# Patient Record
Sex: FEMALE | Race: NATIVE HAWAIIAN OR OTHER PACIFIC ISLANDER | NOT HISPANIC OR LATINO | Employment: UNEMPLOYED | URBAN - METROPOLITAN AREA
[De-identification: names, ages, dates, MRNs, and addresses within clinical notes are randomized per-mention and may not be internally consistent; named-entity substitution may affect disease eponyms.]

---

## 2021-08-30 PROCEDURE — 99285 EMERGENCY DEPT VISIT HI MDM: CPT

## 2021-08-31 ENCOUNTER — HOSPITAL ENCOUNTER (OUTPATIENT)
Facility: MEDICAL CENTER | Age: 51
End: 2021-09-01
Attending: EMERGENCY MEDICINE | Admitting: STUDENT IN AN ORGANIZED HEALTH CARE EDUCATION/TRAINING PROGRAM
Payer: MEDICAID

## 2021-08-31 ENCOUNTER — APPOINTMENT (OUTPATIENT)
Dept: CARDIOLOGY | Facility: MEDICAL CENTER | Age: 51
End: 2021-08-31
Attending: STUDENT IN AN ORGANIZED HEALTH CARE EDUCATION/TRAINING PROGRAM
Payer: MEDICAID

## 2021-08-31 ENCOUNTER — APPOINTMENT (OUTPATIENT)
Dept: RADIOLOGY | Facility: MEDICAL CENTER | Age: 51
End: 2021-08-31
Attending: EMERGENCY MEDICINE
Payer: MEDICAID

## 2021-08-31 DIAGNOSIS — G44.209 TENSION HEADACHE: ICD-10-CM

## 2021-08-31 DIAGNOSIS — I16.1 HYPERTENSIVE EMERGENCY: ICD-10-CM

## 2021-08-31 PROBLEM — Z59.00 HOMELESSNESS: Status: ACTIVE | Noted: 2021-08-31

## 2021-08-31 PROBLEM — I16.0 HYPERTENSIVE URGENCY: Status: ACTIVE | Noted: 2021-08-31

## 2021-08-31 PROBLEM — I50.9 CHF (CONGESTIVE HEART FAILURE) (HCC): Status: ACTIVE | Noted: 2021-08-31

## 2021-08-31 PROBLEM — R79.89 ELEVATED TROPONIN: Status: ACTIVE | Noted: 2021-08-31

## 2021-08-31 PROBLEM — Z91.89 AT RISK FOR OBSTRUCTIVE SLEEP APNEA: Status: ACTIVE | Noted: 2021-08-31

## 2021-08-31 PROBLEM — E66.01 CLASS 3 SEVERE OBESITY IN ADULT (HCC): Status: ACTIVE | Noted: 2021-08-31

## 2021-08-31 PROBLEM — E78.5 HLD (HYPERLIPIDEMIA): Status: ACTIVE | Noted: 2021-08-31

## 2021-08-31 LAB
ALBUMIN SERPL BCP-MCNC: 3.7 G/DL (ref 3.2–4.9)
ALP SERPL-CCNC: 116 U/L (ref 30–99)
ALT SERPL-CCNC: 26 U/L (ref 2–50)
AMPHET UR QL SCN: NEGATIVE
ANION GAP SERPL CALC-SCNC: 14 MMOL/L (ref 7–16)
APPEARANCE UR: CLEAR
AST SERPL-CCNC: 41 U/L (ref 12–45)
BARBITURATES UR QL SCN: NEGATIVE
BASOPHILS # BLD AUTO: 0.7 % (ref 0–1.8)
BASOPHILS # BLD: 0.06 K/UL (ref 0–0.12)
BENZODIAZ UR QL SCN: NEGATIVE
BILIRUB CONJ SERPL-MCNC: <0.2 MG/DL (ref 0.1–0.5)
BILIRUB INDIRECT SERPL-MCNC: ABNORMAL MG/DL (ref 0–1)
BILIRUB SERPL-MCNC: 0.2 MG/DL (ref 0.1–1.5)
BILIRUB UR QL STRIP.AUTO: NEGATIVE
BUN SERPL-MCNC: 21 MG/DL (ref 8–22)
BZE UR QL SCN: NEGATIVE
CALCIUM SERPL-MCNC: 9.2 MG/DL (ref 8.5–10.5)
CANNABINOIDS UR QL SCN: NEGATIVE
CHLORIDE SERPL-SCNC: 99 MMOL/L (ref 96–112)
CK SERPL-CCNC: 68 U/L (ref 0–154)
CO2 SERPL-SCNC: 23 MMOL/L (ref 20–33)
COLOR UR: YELLOW
CREAT SERPL-MCNC: 0.84 MG/DL (ref 0.5–1.4)
EKG IMPRESSION: NORMAL
EKG IMPRESSION: NORMAL
EOSINOPHIL # BLD AUTO: 0.17 K/UL (ref 0–0.51)
EOSINOPHIL NFR BLD: 1.9 % (ref 0–6.9)
ERYTHROCYTE [DISTWIDTH] IN BLOOD BY AUTOMATED COUNT: 38.8 FL (ref 35.9–50)
EST. AVERAGE GLUCOSE BLD GHB EST-MCNC: 252 MG/DL
GLUCOSE BLD-MCNC: 214 MG/DL (ref 65–99)
GLUCOSE BLD-MCNC: 231 MG/DL (ref 65–99)
GLUCOSE BLD-MCNC: 256 MG/DL (ref 65–99)
GLUCOSE SERPL-MCNC: 341 MG/DL (ref 65–99)
GLUCOSE UR STRIP.AUTO-MCNC: 250 MG/DL
HBA1C MFR BLD: 10.4 % (ref 4–5.6)
HCT VFR BLD AUTO: 44.3 % (ref 37–47)
HGB BLD-MCNC: 14.3 G/DL (ref 12–16)
IMM GRANULOCYTES # BLD AUTO: 0.04 K/UL (ref 0–0.11)
IMM GRANULOCYTES NFR BLD AUTO: 0.4 % (ref 0–0.9)
IRON SATN MFR SERPL: 10 % (ref 15–55)
IRON SERPL-MCNC: 37 UG/DL (ref 40–170)
KETONES UR STRIP.AUTO-MCNC: NEGATIVE MG/DL
LEUKOCYTE ESTERASE UR QL STRIP.AUTO: NEGATIVE
LV EJECT FRACT  99904: 45
LV EJECT FRACT MOD 2C 99903: 35.3
LV EJECT FRACT MOD 4C 99902: 40.24
LV EJECT FRACT MOD BP 99901: 39.02
LYMPHOCYTES # BLD AUTO: 2.96 K/UL (ref 1–4.8)
LYMPHOCYTES NFR BLD: 33 % (ref 22–41)
MCH RBC QN AUTO: 26 PG (ref 27–33)
MCHC RBC AUTO-ENTMCNC: 32.3 G/DL (ref 33.6–35)
MCV RBC AUTO: 80.4 FL (ref 81.4–97.8)
METHADONE UR QL SCN: NEGATIVE
MICRO URNS: ABNORMAL
MONOCYTES # BLD AUTO: 0.48 K/UL (ref 0–0.85)
MONOCYTES NFR BLD AUTO: 5.4 % (ref 0–13.4)
NEUTROPHILS # BLD AUTO: 5.26 K/UL (ref 2–7.15)
NEUTROPHILS NFR BLD: 58.6 % (ref 44–72)
NITRITE UR QL STRIP.AUTO: NEGATIVE
NRBC # BLD AUTO: 0 K/UL
NRBC BLD-RTO: 0 /100 WBC
NT-PROBNP SERPL IA-MCNC: 526 PG/ML (ref 0–125)
OPIATES UR QL SCN: NEGATIVE
OXYCODONE UR QL SCN: NEGATIVE
PCP UR QL SCN: NEGATIVE
PH UR STRIP.AUTO: 6 [PH] (ref 5–8)
PLATELET # BLD AUTO: 301 K/UL (ref 164–446)
PMV BLD AUTO: 10.2 FL (ref 9–12.9)
POTASSIUM SERPL-SCNC: 3.8 MMOL/L (ref 3.6–5.5)
PROCALCITONIN SERPL-MCNC: <0.05 NG/ML
PROPOXYPH UR QL SCN: NEGATIVE
PROT SERPL-MCNC: 6.7 G/DL (ref 6–8.2)
PROT UR QL STRIP: NEGATIVE MG/DL
RBC # BLD AUTO: 5.51 M/UL (ref 4.2–5.4)
RBC UR QL AUTO: NEGATIVE
SODIUM SERPL-SCNC: 136 MMOL/L (ref 135–145)
SP GR UR STRIP.AUTO: 1.01
T4 FREE SERPL-MCNC: 1.11 NG/DL (ref 0.93–1.7)
TIBC SERPL-MCNC: 355 UG/DL (ref 250–450)
TROPONIN T SERPL-MCNC: 33 NG/L (ref 6–19)
TROPONIN T SERPL-MCNC: 35 NG/L (ref 6–19)
TROPONIN T SERPL-MCNC: 41 NG/L (ref 6–19)
TSH SERPL DL<=0.005 MIU/L-ACNC: 11.4 UIU/ML (ref 0.38–5.33)
UIBC SERPL-MCNC: 318 UG/DL (ref 110–370)
UROBILINOGEN UR STRIP.AUTO-MCNC: 0.2 MG/DL
VIT B12 SERPL-MCNC: 662 PG/ML (ref 211–911)
WBC # BLD AUTO: 9 K/UL (ref 4.8–10.8)

## 2021-08-31 PROCEDURE — A9270 NON-COVERED ITEM OR SERVICE: HCPCS | Performed by: STUDENT IN AN ORGANIZED HEALTH CARE EDUCATION/TRAINING PROGRAM

## 2021-08-31 PROCEDURE — 96374 THER/PROPH/DIAG INJ IV PUSH: CPT | Mod: XU

## 2021-08-31 PROCEDURE — 81003 URINALYSIS AUTO W/O SCOPE: CPT | Mod: XU

## 2021-08-31 PROCEDURE — 700111 HCHG RX REV CODE 636 W/ 250 OVERRIDE (IP): Performed by: STUDENT IN AN ORGANIZED HEALTH CARE EDUCATION/TRAINING PROGRAM

## 2021-08-31 PROCEDURE — 93306 TTE W/DOPPLER COMPLETE: CPT | Mod: 26 | Performed by: INTERNAL MEDICINE

## 2021-08-31 PROCEDURE — 83036 HEMOGLOBIN GLYCOSYLATED A1C: CPT

## 2021-08-31 PROCEDURE — 80307 DRUG TEST PRSMV CHEM ANLYZR: CPT

## 2021-08-31 PROCEDURE — 93005 ELECTROCARDIOGRAM TRACING: CPT | Performed by: HOSPITALIST

## 2021-08-31 PROCEDURE — 96376 TX/PRO/DX INJ SAME DRUG ADON: CPT | Mod: XU

## 2021-08-31 PROCEDURE — 71045 X-RAY EXAM CHEST 1 VIEW: CPT

## 2021-08-31 PROCEDURE — 700102 HCHG RX REV CODE 250 W/ 637 OVERRIDE(OP): Performed by: STUDENT IN AN ORGANIZED HEALTH CARE EDUCATION/TRAINING PROGRAM

## 2021-08-31 PROCEDURE — G0378 HOSPITAL OBSERVATION PER HR: HCPCS

## 2021-08-31 PROCEDURE — 83880 ASSAY OF NATRIURETIC PEPTIDE: CPT

## 2021-08-31 PROCEDURE — 700117 HCHG RX CONTRAST REV CODE 255: Performed by: STUDENT IN AN ORGANIZED HEALTH CARE EDUCATION/TRAINING PROGRAM

## 2021-08-31 PROCEDURE — 93306 TTE W/DOPPLER COMPLETE: CPT

## 2021-08-31 PROCEDURE — 93005 ELECTROCARDIOGRAM TRACING: CPT | Performed by: EMERGENCY MEDICINE

## 2021-08-31 PROCEDURE — 84145 PROCALCITONIN (PCT): CPT

## 2021-08-31 PROCEDURE — 99220 PR INITIAL OBSERVATION CARE,LEVL III: CPT | Performed by: STUDENT IN AN ORGANIZED HEALTH CARE EDUCATION/TRAINING PROGRAM

## 2021-08-31 PROCEDURE — 700102 HCHG RX REV CODE 250 W/ 637 OVERRIDE(OP): Performed by: HOSPITALIST

## 2021-08-31 PROCEDURE — 84443 ASSAY THYROID STIM HORMONE: CPT

## 2021-08-31 PROCEDURE — 84439 ASSAY OF FREE THYROXINE: CPT

## 2021-08-31 PROCEDURE — 83540 ASSAY OF IRON: CPT

## 2021-08-31 PROCEDURE — 80076 HEPATIC FUNCTION PANEL: CPT

## 2021-08-31 PROCEDURE — 96372 THER/PROPH/DIAG INJ SC/IM: CPT | Mod: XU

## 2021-08-31 PROCEDURE — 85025 COMPLETE CBC W/AUTO DIFF WBC: CPT

## 2021-08-31 PROCEDURE — 70450 CT HEAD/BRAIN W/O DYE: CPT

## 2021-08-31 PROCEDURE — 80048 BASIC METABOLIC PNL TOTAL CA: CPT

## 2021-08-31 PROCEDURE — 84484 ASSAY OF TROPONIN QUANT: CPT

## 2021-08-31 PROCEDURE — 700101 HCHG RX REV CODE 250: Performed by: EMERGENCY MEDICINE

## 2021-08-31 PROCEDURE — 96375 TX/PRO/DX INJ NEW DRUG ADDON: CPT | Mod: XU

## 2021-08-31 PROCEDURE — 82962 GLUCOSE BLOOD TEST: CPT

## 2021-08-31 PROCEDURE — 700111 HCHG RX REV CODE 636 W/ 250 OVERRIDE (IP): Performed by: EMERGENCY MEDICINE

## 2021-08-31 PROCEDURE — 82550 ASSAY OF CK (CPK): CPT

## 2021-08-31 PROCEDURE — 82607 VITAMIN B-12: CPT

## 2021-08-31 PROCEDURE — 83550 IRON BINDING TEST: CPT

## 2021-08-31 PROCEDURE — A9270 NON-COVERED ITEM OR SERVICE: HCPCS | Performed by: HOSPITALIST

## 2021-08-31 RX ORDER — ONDANSETRON 4 MG/1
4 TABLET, ORALLY DISINTEGRATING ORAL EVERY 4 HOURS PRN
Status: DISCONTINUED | OUTPATIENT
Start: 2021-08-31 | End: 2021-09-01 | Stop reason: HOSPADM

## 2021-08-31 RX ORDER — DEXTROSE MONOHYDRATE 25 G/50ML
50 INJECTION, SOLUTION INTRAVENOUS
Status: DISCONTINUED | OUTPATIENT
Start: 2021-08-31 | End: 2021-09-01 | Stop reason: HOSPADM

## 2021-08-31 RX ORDER — FUROSEMIDE 10 MG/ML
20 INJECTION INTRAMUSCULAR; INTRAVENOUS
Status: DISCONTINUED | OUTPATIENT
Start: 2021-08-31 | End: 2021-09-01 | Stop reason: HOSPADM

## 2021-08-31 RX ORDER — HEPARIN SODIUM 5000 [USP'U]/ML
5000 INJECTION, SOLUTION INTRAVENOUS; SUBCUTANEOUS EVERY 8 HOURS
Status: DISCONTINUED | OUTPATIENT
Start: 2021-08-31 | End: 2021-09-01 | Stop reason: HOSPADM

## 2021-08-31 RX ORDER — LABETALOL HYDROCHLORIDE 5 MG/ML
10 INJECTION, SOLUTION INTRAVENOUS EVERY 4 HOURS PRN
Status: DISCONTINUED | OUTPATIENT
Start: 2021-08-31 | End: 2021-09-01 | Stop reason: HOSPADM

## 2021-08-31 RX ORDER — PROMETHAZINE HYDROCHLORIDE 25 MG/1
12.5-25 SUPPOSITORY RECTAL EVERY 4 HOURS PRN
Status: DISCONTINUED | OUTPATIENT
Start: 2021-08-31 | End: 2021-09-01 | Stop reason: HOSPADM

## 2021-08-31 RX ORDER — LOSARTAN POTASSIUM 50 MG/1
25 TABLET ORAL
Status: DISCONTINUED | OUTPATIENT
Start: 2021-08-31 | End: 2021-09-01 | Stop reason: HOSPADM

## 2021-08-31 RX ORDER — PROCHLORPERAZINE EDISYLATE 5 MG/ML
5-10 INJECTION INTRAMUSCULAR; INTRAVENOUS EVERY 4 HOURS PRN
Status: DISCONTINUED | OUTPATIENT
Start: 2021-08-31 | End: 2021-09-01 | Stop reason: HOSPADM

## 2021-08-31 RX ORDER — ACETAMINOPHEN 325 MG/1
650 TABLET ORAL EVERY 6 HOURS PRN
Status: DISCONTINUED | OUTPATIENT
Start: 2021-08-31 | End: 2021-09-01 | Stop reason: HOSPADM

## 2021-08-31 RX ORDER — AMOXICILLIN 250 MG
2 CAPSULE ORAL 2 TIMES DAILY
Status: DISCONTINUED | OUTPATIENT
Start: 2021-08-31 | End: 2021-09-01 | Stop reason: HOSPADM

## 2021-08-31 RX ORDER — LEVOTHYROXINE SODIUM 0.03 MG/1
25 TABLET ORAL
Status: DISCONTINUED | OUTPATIENT
Start: 2021-08-31 | End: 2021-09-01 | Stop reason: HOSPADM

## 2021-08-31 RX ORDER — PROMETHAZINE HYDROCHLORIDE 25 MG/1
12.5-25 TABLET ORAL EVERY 4 HOURS PRN
Status: DISCONTINUED | OUTPATIENT
Start: 2021-08-31 | End: 2021-09-01 | Stop reason: HOSPADM

## 2021-08-31 RX ORDER — LABETALOL HYDROCHLORIDE 5 MG/ML
10 INJECTION, SOLUTION INTRAVENOUS ONCE
Status: COMPLETED | OUTPATIENT
Start: 2021-08-31 | End: 2021-08-31

## 2021-08-31 RX ORDER — CLONIDINE HYDROCHLORIDE 0.1 MG/1
0.1 TABLET ORAL EVERY 6 HOURS PRN
Status: DISCONTINUED | OUTPATIENT
Start: 2021-08-31 | End: 2021-09-01 | Stop reason: HOSPADM

## 2021-08-31 RX ORDER — MORPHINE SULFATE 4 MG/ML
2-4 INJECTION, SOLUTION INTRAMUSCULAR; INTRAVENOUS
Status: DISCONTINUED | OUTPATIENT
Start: 2021-08-31 | End: 2021-09-01 | Stop reason: HOSPADM

## 2021-08-31 RX ORDER — POLYETHYLENE GLYCOL 3350 17 G/17G
1 POWDER, FOR SOLUTION ORAL
Status: DISCONTINUED | OUTPATIENT
Start: 2021-08-31 | End: 2021-09-01 | Stop reason: HOSPADM

## 2021-08-31 RX ORDER — ENALAPRILAT 1.25 MG/ML
1.25 INJECTION INTRAVENOUS ONCE
Status: COMPLETED | OUTPATIENT
Start: 2021-08-31 | End: 2021-08-31

## 2021-08-31 RX ORDER — BISACODYL 10 MG
10 SUPPOSITORY, RECTAL RECTAL
Status: DISCONTINUED | OUTPATIENT
Start: 2021-08-31 | End: 2021-09-01 | Stop reason: HOSPADM

## 2021-08-31 RX ORDER — CARVEDILOL 3.12 MG/1
3.12 TABLET ORAL 2 TIMES DAILY WITH MEALS
Status: DISCONTINUED | OUTPATIENT
Start: 2021-08-31 | End: 2021-09-01 | Stop reason: HOSPADM

## 2021-08-31 RX ORDER — ONDANSETRON 2 MG/ML
4 INJECTION INTRAMUSCULAR; INTRAVENOUS EVERY 4 HOURS PRN
Status: DISCONTINUED | OUTPATIENT
Start: 2021-08-31 | End: 2021-09-01 | Stop reason: HOSPADM

## 2021-08-31 RX ORDER — ATORVASTATIN CALCIUM 40 MG/1
40 TABLET, FILM COATED ORAL EVERY EVENING
Status: DISCONTINUED | OUTPATIENT
Start: 2021-08-31 | End: 2021-09-01 | Stop reason: HOSPADM

## 2021-08-31 RX ORDER — ENALAPRILAT 1.25 MG/ML
1.25 INJECTION INTRAVENOUS EVERY 6 HOURS PRN
Status: DISCONTINUED | OUTPATIENT
Start: 2021-08-31 | End: 2021-09-01 | Stop reason: HOSPADM

## 2021-08-31 RX ORDER — NITROGLYCERIN 0.4 MG/1
0.4 TABLET SUBLINGUAL
Status: DISCONTINUED | OUTPATIENT
Start: 2021-08-31 | End: 2021-09-01 | Stop reason: HOSPADM

## 2021-08-31 RX ORDER — INSULIN LISPRO 100 [IU]/ML
0.2 INJECTION, SOLUTION INTRAVENOUS; SUBCUTANEOUS
Status: DISCONTINUED | OUTPATIENT
Start: 2021-08-31 | End: 2021-09-01 | Stop reason: HOSPADM

## 2021-08-31 RX ORDER — INSULIN LISPRO 100 [IU]/ML
2-9 INJECTION, SOLUTION INTRAVENOUS; SUBCUTANEOUS
Status: DISCONTINUED | OUTPATIENT
Start: 2021-08-31 | End: 2021-09-01 | Stop reason: HOSPADM

## 2021-08-31 RX ADMIN — INSULIN LISPRO 3 UNITS: 100 INJECTION, SOLUTION INTRAVENOUS; SUBCUTANEOUS at 20:26

## 2021-08-31 RX ADMIN — INSULIN LISPRO 7 UNITS: 100 INJECTION, SOLUTION INTRAVENOUS; SUBCUTANEOUS at 10:39

## 2021-08-31 RX ADMIN — METFORMIN HYDROCHLORIDE 500 MG: 500 TABLET ORAL at 08:50

## 2021-08-31 RX ADMIN — FUROSEMIDE 20 MG: 10 INJECTION, SOLUTION INTRAMUSCULAR; INTRAVENOUS at 15:54

## 2021-08-31 RX ADMIN — INSULIN GLARGINE 10 UNITS: 100 INJECTION, SOLUTION SUBCUTANEOUS at 18:38

## 2021-08-31 RX ADMIN — LABETALOL HYDROCHLORIDE 10 MG: 5 INJECTION, SOLUTION INTRAVENOUS at 01:30

## 2021-08-31 RX ADMIN — LEVOTHYROXINE SODIUM 25 MCG: 0.03 TABLET ORAL at 08:50

## 2021-08-31 RX ADMIN — METFORMIN HYDROCHLORIDE 500 MG: 500 TABLET ORAL at 18:31

## 2021-08-31 RX ADMIN — ATORVASTATIN CALCIUM 40 MG: 40 TABLET, FILM COATED ORAL at 18:31

## 2021-08-31 RX ADMIN — FUROSEMIDE 20 MG: 10 INJECTION, SOLUTION INTRAMUSCULAR; INTRAVENOUS at 08:50

## 2021-08-31 RX ADMIN — HEPARIN SODIUM 5000 UNITS: 5000 INJECTION, SOLUTION INTRAVENOUS; SUBCUTANEOUS at 08:51

## 2021-08-31 RX ADMIN — CARVEDILOL 3.12 MG: 3.12 TABLET, FILM COATED ORAL at 08:50

## 2021-08-31 RX ADMIN — INSULIN LISPRO 3 UNITS: 100 INJECTION, SOLUTION INTRAVENOUS; SUBCUTANEOUS at 18:39

## 2021-08-31 RX ADMIN — CARVEDILOL 3.12 MG: 3.12 TABLET, FILM COATED ORAL at 18:31

## 2021-08-31 RX ADMIN — ENALAPRILAT 1.25 MG: 1.25 INJECTION INTRAVENOUS at 03:25

## 2021-08-31 RX ADMIN — LABETALOL HYDROCHLORIDE 10 MG: 5 INJECTION, SOLUTION INTRAVENOUS at 01:46

## 2021-08-31 RX ADMIN — HEPARIN SODIUM 5000 UNITS: 5000 INJECTION, SOLUTION INTRAVENOUS; SUBCUTANEOUS at 15:54

## 2021-08-31 RX ADMIN — CLONIDINE HYDROCHLORIDE 0.1 MG: 0.1 TABLET ORAL at 05:07

## 2021-08-31 RX ADMIN — INSULIN LISPRO 7 UNITS: 100 INJECTION, SOLUTION INTRAVENOUS; SUBCUTANEOUS at 18:43

## 2021-08-31 RX ADMIN — HUMAN ALBUMIN MICROSPHERES AND PERFLUTREN 3 ML: 10; .22 INJECTION, SOLUTION INTRAVENOUS at 13:41

## 2021-08-31 RX ADMIN — LOSARTAN POTASSIUM 25 MG: 50 TABLET, FILM COATED ORAL at 08:50

## 2021-08-31 RX ADMIN — INSULIN LISPRO 5 UNITS: 100 INJECTION, SOLUTION INTRAVENOUS; SUBCUTANEOUS at 10:37

## 2021-08-31 ASSESSMENT — LIFESTYLE VARIABLES
ON A TYPICAL DAY WHEN YOU DRINK ALCOHOL HOW MANY DRINKS DO YOU HAVE: 0
AVERAGE NUMBER OF DAYS PER WEEK YOU HAVE A DRINK CONTAINING ALCOHOL: 0
ALCOHOL_USE: NO
EVER FELT BAD OR GUILTY ABOUT YOUR DRINKING: NO
TOTAL SCORE: 0
HAVE YOU EVER FELT YOU SHOULD CUT DOWN ON YOUR DRINKING: NO
TOTAL SCORE: 0
EVER HAD A DRINK FIRST THING IN THE MORNING TO STEADY YOUR NERVES TO GET RID OF A HANGOVER: NO
TOTAL SCORE: 0
HOW MANY TIMES IN THE PAST YEAR HAVE YOU HAD 5 OR MORE DRINKS IN A DAY: 0
SUBSTANCE_ABUSE: 0
HAVE PEOPLE ANNOYED YOU BY CRITICIZING YOUR DRINKING: NO
CONSUMPTION TOTAL: NEGATIVE

## 2021-08-31 ASSESSMENT — FIBROSIS 4 INDEX
FIB4 SCORE: 1.36
FIB4 SCORE: 1.36

## 2021-08-31 ASSESSMENT — ENCOUNTER SYMPTOMS
FALLS: 0
COUGH: 0
FLANK PAIN: 0
PALPITATIONS: 1
HEADACHES: 1
SHORTNESS OF BREATH: 1
DEPRESSION: 0
SPEECH CHANGE: 0
FEVER: 0
BRUISES/BLEEDS EASILY: 0
DOUBLE VISION: 0
NAUSEA: 0
VOMITING: 0
ABDOMINAL PAIN: 0
MYALGIAS: 0
FOCAL WEAKNESS: 0
WEAKNESS: 1
HEARTBURN: 0
CHILLS: 0
DIZZINESS: 0
BLURRED VISION: 0
SPUTUM PRODUCTION: 0

## 2021-08-31 ASSESSMENT — PATIENT HEALTH QUESTIONNAIRE - PHQ9
2. FEELING DOWN, DEPRESSED, IRRITABLE, OR HOPELESS: NOT AT ALL
SUM OF ALL RESPONSES TO PHQ9 QUESTIONS 1 AND 2: 0
1. LITTLE INTEREST OR PLEASURE IN DOING THINGS: NOT AT ALL

## 2021-08-31 ASSESSMENT — PAIN DESCRIPTION - PAIN TYPE
TYPE: ACUTE PAIN
TYPE: ACUTE PAIN

## 2021-08-31 NOTE — ASSESSMENT & PLAN NOTE
Secondary to medical noncompliance -- off med since 3/2021  /134 at ED arrival -- improved to SBP < 200 with administered labetalol 20mg IV + vasotec 1.25mg in ED    Reinitiate antihypertensive medications    Losartan 25mg daily  Coreg 3.125mg BID    F/u CT head to r/o ICH/SAH -- pending

## 2021-08-31 NOTE — PROGRESS NOTES
This is 51-year-old female with past medical history significant for hypertension, obesity presented to ER on 8/31/2020 with a complaint of several days of headache that has progressed back her turn worsened over the last 2 days.    She  became homeless in 3/2021 since then she had not been taking her antihypertensive medication.  Upon presentation in ER she is noted to be hypertensive with blood pressure of 256/134.  In ER, patient received IV labetalol 20 mg and Vasotec 1.25 after which her blood pressure has improved. Troponin 31,  chest x-ray, cardiomegaly with bilateral vascular congestion CT head did not show any acute intracranial hemorrhage.    Upon my evaluation patient is noted to be in ER, her blood pressure improved 152/68; she was started on losartan 25 mg daily, Coreg 3.125 mg p.o. twice daily.    She is noted to have hemoglobin A1c of 10.4, diabetic education ordered, she was started on Metformin 500 mg p.o. twice daily along with Lantus.  Her TSH is elevated at 11.4, will provide Synthroid 25 MCG.  She needs to check her TSH/free T4 in 4 to 6 weeks    She is noted to have elevated troponin of 33, 41; EKG showed LVH no acute ST and T wave changes suggestive of  active ischemia.  Trend troponin, pending echocardiogram

## 2021-08-31 NOTE — H&P
Hospital Medicine History & Physical Note    Date of Service  8/31/2021    Primary Care Physician  No primary care provider on file.    Consultants  None    Code Status  Full Code    Chief Complaint  Chief Complaint   Patient presents with   • Hypertension   • Head Ache   • Medication Refill       History of Presenting Illness  Vilma Sun is a 51 y.o. obese female with history of hypertension, probable diabetes who presented 8/31/2021 with several days of episodic headache, worsening over the past 2 days.  Patient is reported she became homeless approximately around March 2021, at that time she has stopped taking her losartan and hydrochlorothiazide as she ran out.  Due to difficult social situation, she did not seek medical attention or see a doctor.  She is currently residing in [facility] with her daughter, came to ER as her headaches have been worsening.  Denies associated chest pain, palpitation, fever chills generalized weakness.  She does admit to exertional dyspnea and increased lower extremity swelling.  Upon ER arrival, /134 --which were improved to SBP below 200 with administration of labetalol 20 mg IV and Vasotec 1.25 mg.  Troponin T 31, likely due to uncontrolled hypertension, , CXR cardiomegaly with bilateral vascular congestion.  CT head is currently pending time admission.  I am being asked to admit patient for hypertensive urgency, probable early CHF.  Admitted to medicine service for further evaluation treatment.    Offered covid vaccination -- patient would like to think about it at this time.    I discussed the plan of care with patient, family and bedside RN.    Review of Systems  Review of Systems   Constitutional: Negative for chills, fever and malaise/fatigue.   HENT: Negative for hearing loss and tinnitus.    Eyes: Negative for blurred vision and double vision.   Respiratory: Positive for shortness of breath. Negative for cough and sputum production.    Cardiovascular:  Positive for palpitations and leg swelling. Negative for chest pain.   Gastrointestinal: Negative for abdominal pain, heartburn, nausea and vomiting.   Genitourinary: Negative for dysuria and flank pain.   Musculoskeletal: Negative for falls and myalgias.   Skin: Negative for itching and rash.   Neurological: Positive for weakness (generalized) and headaches. Negative for dizziness, speech change and focal weakness.   Endo/Heme/Allergies: Negative for environmental allergies. Does not bruise/bleed easily.   Psychiatric/Behavioral: Negative for depression, substance abuse and suicidal ideas.   All other systems reviewed and are negative.      Past Medical History   has no past medical history on file.   HTN, DM    Surgical History   has no past surgical history on file.   Cholecystectomy   x2    Family History  family history is not on file.   Mother had CVA at age 57  Denies FH of MI  Family history reviewed with patient. There is family history that is pertinent to the chief complaint.     Social History   reports that she has never smoked. She has never used smokeless tobacco. She reports previous alcohol use. She reports previous drug use.  Denies tobacco smoking in lifetime  Social EtOH use  Denies IVDA  Previously homeless since 2021, currently residing in [facility] with her daughter    Allergies  No Known Allergies    Medications  None       Physical Exam  Temp:  [36.1 °C (96.9 °F)] 36.1 °C (96.9 °F)  Pulse:  [66-94] 66  Resp:  [18] 18  BP: (182-263)/() 182/85  SpO2:  [92 %-100 %] 96 %    Physical Exam  Vitals and nursing note reviewed.   Constitutional:       General: She is not in acute distress.     Appearance: She is obese.   HENT:      Head: Normocephalic and atraumatic.      Nose: Nose normal.      Mouth/Throat:      Mouth: Mucous membranes are moist.      Pharynx: Oropharynx is clear.   Eyes:      General: No scleral icterus.     Extraocular Movements: Extraocular movements intact.    Cardiovascular:      Rate and Rhythm: Regular rhythm. Tachycardia present.      Pulses: Normal pulses.      Heart sounds: No friction rub.   Pulmonary:      Effort: Pulmonary effort is normal.      Breath sounds: No wheezing or rales.      Comments: Decreased bibasilar breath sounds  Diffuse crackles  Abdominal:      General: There is no distension.      Palpations: Abdomen is soft.      Tenderness: There is no abdominal tenderness. There is no guarding or rebound.   Musculoskeletal:         General: No signs of injury. Normal range of motion.      Cervical back: Neck supple. No tenderness.      Comments: +1 pitting edema lower extremity bilaterally, nontender   Skin:     General: Skin is warm and dry.      Capillary Refill: Capillary refill takes less than 2 seconds.   Neurological:      General: No focal deficit present.      Mental Status: She is alert and oriented to person, place, and time.      Motor: No weakness.   Psychiatric:         Mood and Affect: Mood normal.         Laboratory:  Recent Labs     08/31/21  0055   WBC 9.0   RBC 5.51*   HEMOGLOBIN 14.3   HEMATOCRIT 44.3   MCV 80.4*   MCH 26.0*   MCHC 32.3*   RDW 38.8   PLATELETCT 301   MPV 10.2     Recent Labs     08/31/21  0055   SODIUM 136   POTASSIUM 3.8   CHLORIDE 99   CO2 23   GLUCOSE 341*   BUN 21   CREATININE 0.84   CALCIUM 9.2     Recent Labs     08/31/21  0055   GLUCOSE 341*         Recent Labs     08/31/21  0055   NTPROBNP 526*         Recent Labs     08/31/21  0055   TROPONINT 33*       EKG reviewed by me -- NSR, LVH, no ST elevations or depressions        CXR reviewed by me - cardiomegaly, b/l vascular congestion      Imaging:  CT-HEAD W/O   Final Result      No acute intracranial hemorrhage is identified.      Mild patchy hypodensity is present.  This is a nonspecific finding which often is found to represent chronic microvascular disease.  Demyelination, age indeterminant ischemia and gliosis are also common possibilities.       DX-CHEST-PORTABLE (1 VIEW)   Final Result      Cardiac silhouette enlargement with minor fissure visualization that may indicate minimal interstitial edema      EC-ECHOCARDIOGRAM COMPLETE W/O CONT    (Results Pending)       X-Ray:  I have personally reviewed the images and compared with prior images.  EKG:  I have personally reviewed the images and compared with prior images.    Assessment/Plan:  I anticipate this patient is appropriate for observation status at this time.    * Hypertensive urgency  Assessment & Plan  Secondary to medical noncompliance -- off med since 3/2021  /134 at ED arrival -- improved to SBP < 200 with administered labetalol 20mg IV + vasotec 1.25mg in ED    Reinitiate antihypertensive medications    Losartan 25mg daily  Coreg 3.125mg BID    F/u CT head to r/o ICH/SAH -- pending    CHF (congestive heart failure) (HCC)  Assessment & Plan  Suspected CHF, unknown EF  Likely secondary to dietary indiscretion and uncontrolled hypertension    CXR b/l vascular congestion with cardiomegaly,     ASA/statin  ARB/BB  Gentle diuresis - lasix 20mg IV BID    F/u echo    Uncontrolled diabetes mellitus (HCC)  Assessment & Plan  Glucose 341  F/u HbA1c    Metformin  glargine + ISS    Elevated troponin  Assessment & Plan  Likely due to uncontrolled HTN  Trend CE and EKGs    PRN nitro SL, morphine  ASA/statin    Homelessness  Assessment & Plan  CM/SW assistance    HLD (hyperlipidemia)  Assessment & Plan  Initiate statin  Target LDL <70    At risk for obstructive sleep apnea  Assessment & Plan  Outpatient sleep study    Class 3 severe obesity in adult (HCC)  Assessment & Plan  Diet and lifestyle modifications  May benefit from bariatric surgery      VTE prophylaxis: heparin ppx

## 2021-08-31 NOTE — ED PROVIDER NOTES
ER Provider Note     Scribed for Magno Zheng M.D. by Raffaele Stearns. 8/31/2021, 12:47 AM.    Primary Care Provider: No primary care provider noted.  Means of Arrival: Walk-in   History obtained from: Patient  History limited by: None     CHIEF COMPLAINT  Chief Complaint   Patient presents with    Hypertension    Head Ache    Medication Refill       HPI  Vilma Sun is a 51 y.o. female who presents to the Emergency Department for evaluation of an acute headache onset earlier today. She has not had access to healthcare recently, and ran out of all of her medications several months ago. She is supposed to be on Losartan Hydrochlorothiazide for her hypertension, and states that she has felt as if her blood pressure has been high lately. It has been years since she last took this medication. She has had additional leg swelling and dyspnea on exertion. No chest pain.     REVIEW OF SYSTEMS  See HPI for further details. All other systems are negative.     PAST MEDICAL HISTORY       SURGICAL HISTORY  patient denies any surgical history    SOCIAL HISTORY  Social History     Tobacco Use    Smoking status: Never Smoker    Smokeless tobacco: Never Used   Substance Use Topics    Alcohol use: Not Currently    Drug use: Not Currently      Social History     Substance and Sexual Activity   Drug Use Not Currently       FAMILY HISTORY  History reviewed. No pertinent family history.    CURRENT MEDICATIONS  No current outpatient medications    ALLERGIES  No Known Allergies    PHYSICAL EXAM  VITAL SIGNS: BP (!) 256/134   Pulse 94   Temp 36.1 °C (96.9 °F) (Temporal)   Resp 18   Ht 1.524 m (5')   Wt 111 kg (245 lb)   LMP 08/17/2021 (Approximate)   SpO2 100% Comment: Room air  Breastfeeding No   BMI 47.85 kg/m²      Constitutional: Alert in no apparent distress.  HENT: No signs of trauma, Bilateral external ears normal, Nose normal.   Eyes: Pupils are equal and reactive, Conjunctiva normal, Non-icteric.   Neck: Normal  range of motion, No tenderness, Supple, No stridor.   Lymphatic: No lymphadenopathy noted.   Cardiovascular: Regular rate and rhythm, no palpable thrill  Thorax & Lungs: Slightly diminished at the bases, mild shortness of breath. No chest tenderness.   Abdomen: Bowel sounds normal, Soft, No tenderness, No masses, No pulsatile masses. No peritoneal signs.  Skin: Warm, Dry, No erythema, No rash.   Back: No bony tenderness, No CVA tenderness.   Extremities: Intact distal pulses, 1+ edema in lower extremities, No tenderness, No cyanosis.  Musculoskeletal: Good range of motion in all major joints. No tenderness to palpation or major deformities noted.   Neurologic: Alert , Normal motor function, Normal sensory function, No focal deficits noted.   Psychiatric: Affect normal, Judgment normal, Mood normal.     DIAGNOSTIC STUDIES / PROCEDURES    EKG Interpretation:  Interpreted by me    12 Lead EKG interpreted by me to show:  Normal sinus rhythm  Rate 70  Axis: Normal  Intervals: Normal  T wave inversion in lead 2 and AVL  Normal ST segments, no elevation or depression  My impression of this EKG: Does not indicate ischemia or arrhythmia at this time.     LABS  Labs Reviewed   CBC WITH DIFFERENTIAL - Abnormal; Notable for the following components:       Result Value    RBC 5.51 (*)     MCV 80.4 (*)     MCH 26.0 (*)     MCHC 32.3 (*)     All other components within normal limits   BASIC METABOLIC PANEL - Abnormal; Notable for the following components:    Glucose 341 (*)     All other components within normal limits   TROPONIN - Abnormal; Notable for the following components:    Troponin T 33 (*)     All other components within normal limits   PROBRAIN NATRIURETIC PEPTIDE, NT - Abnormal; Notable for the following components:    NT-proBNP 526 (*)     All other components within normal limits   TROPONIN - Abnormal; Notable for the following components:    Troponin T 41 (*)     All other components within normal limits    HEMOGLOBIN A1C - Abnormal; Notable for the following components:    Glycohemoglobin 10.4 (*)     All other components within normal limits   TSH WITH REFLEX TO FT4 - Abnormal; Notable for the following components:    TSH 11.400 (*)     All other components within normal limits   ESTIMATED GFR   PROCALCITONIN   FREE THYROXINE   URINE DRUG SCREEN   URINALYSIS   HEPATIC FUNCTION PANEL   CREATINE KINASE   TROPONIN     All labs reviewed by me.    RADIOLOGY  CT-HEAD W/O   Final Result      No acute intracranial hemorrhage is identified.      Mild patchy hypodensity is present.  This is a nonspecific finding which often is found to represent chronic microvascular disease.  Demyelination, age indeterminant ischemia and gliosis are also common possibilities.      DX-CHEST-PORTABLE (1 VIEW)   Final Result      Cardiac silhouette enlargement with minor fissure visualization that may indicate minimal interstitial edema      EC-ECHOCARDIOGRAM COMPLETE W/O CONT    (Results Pending)      The radiologist's interpretation of all radiological studies have been reviewed by me.    COURSE & MEDICAL DECISION MAKING  Pertinent Labs & Imaging studies reviewed. (See chart for details)    This is a 51 y.o. female that presents with what appears to be hypertensive emergency.  We'll get a screening EKG to evaluate for myocardial ischemia as well as a creatinine to evaluate for renal dysfunction.  There is some concern that this patient might be in congestive heart failure therefore we'll get a chest x-ray as well as a BNP and then reassess..     12:47 AM - Patient seen and examined at bedside. Ordered EKG, DX-chest, Troponin, BNP, CBC with differential, and BMP.  Patient will be medicated with labetalol for her symptoms.     1:38 AM - Patient treated with labetalol.    1:47 AM - I discussed the patient's case and the above findings with Dr. Viramontes (Hospitalist) who agrees to evaluate the patient for hospitalization. He requests a CT-head be  obtained.    Patient was found to have an enlarged cardiac silhouette.  The patient has a negative CT scan of the head.  The patient does have a mildly elevated troponin as well as proBNP that is in the 500s.  There is some mild edema on the chest x-ray.  The patient's blood pressure is being controlled with beta-blockers.  We'll admit the patient to the hospitalist in guarded condition.    CRITICAL CARE  The very real possibilty of a deterioration of this patient's condition required the highest level of my preparedness for sudden, emergent intervention.  I provided critical care services, which included medication orders, frequent reevaluations of the patient's condition and response to treatment, ordering and reviewing test results, and discussing the case with various consultants.  The critical care time associated with the care of the patient was 35 minutes. Review chart for interventions. This time is exclusive of any other billable procedures.     DISPOSITION:  Patient will be hospitalized by Dr. Viramontes in guarded condition.    FINAL IMPRESSION  1. Hypertensive emergency    2. Tension headache         The critical care time associated with the care of the patient was 35 minutes.      Raffaele GUERRA (Meredith), am scribing for, and in the presence of, Magno Zheng M.D..    Electronically signed by: Raffaele Stearns (Meredith), 8/31/2021    Magno GUERRA M.D. personally performed the services described in this documentation, as scribed by Raffaele Stearns in my presence, and it is both accurate and complete.     The note accurately reflects work and decisions made by me.  Magno Zheng M.D.  8/31/2021  5:38 AM

## 2021-08-31 NOTE — ED NOTES
Pt resting in bed, family at bedside, VSS on RA, gCS 15, NAD, aware POC to medicate per MAR with breakfast and wait for inpatient bed, will continue monitor.

## 2021-08-31 NOTE — ED NOTES
Pt resting in bed, VSS on RA, gCS 15, NAD, aware POC to medicate per MAR with breakfast and wait for inpatient bed, will continue monitor.

## 2021-08-31 NOTE — ED NOTES
Med Rec completed per patient   Allergies reviewed  No ORAL antibiotics in last 30 days    Patient is not currently taking any daily medications. It has been about 1 year

## 2021-08-31 NOTE — ED TRIAGE NOTES
iVlma Sun   51 y.o. female   Chief Complaint   Patient presents with   • Hypertension   • Head Ache   • Medication Refill     The patient presents for headache in the setting of uncontrolled hypertension. She reports that her and her family have been staying in and out motels for the previous one year and she has not had access to healthcare. She reports that she is supposed to be taking blood pressure medications but hasn't for several months. She endorses a headache. Denies any other symptoms at this time.      Patient ambulatory to triage with a steady gait for above mentioned complaint.  Patient is alert and oriented, speaking in full sentences, following commands and responds appropriately to questions. Not in any apparent distress. Respirations are even and unlabored.   Patient placed in lobby. Patient educated on triage process. Patient encouraged to alert staff of any changes in status.     BP (!) 256/134   Pulse 94   Temp 36.1 °C (96.9 °F) (Temporal)   Resp 18   Ht 1.524 m (5')   Wt 111 kg (245 lb)   LMP 08/17/2021 (Approximate)   SpO2 100% Comment: Room air  Breastfeeding No   BMI 47.85 kg/m²

## 2021-08-31 NOTE — ASSESSMENT & PLAN NOTE
Suspected CHF, unknown EF  Likely secondary to dietary indiscretion and uncontrolled hypertension    CXR b/l vascular congestion with cardiomegaly,     ASA/statin  ARB/BB  Gentle diuresis - lasix 20mg IV BID    F/u echo

## 2021-08-31 NOTE — PROGRESS NOTES
Report received from GIOVANNA Marie.  Patient brought up to the CDU from the Red Pod of the Ed via gurney by an ACLS RN and a tele box.  POC gone over with the pt and all questions answered.  Patient A & O x 4.  No apparent signs of distress.  Safety precautions in place.  Patient educated to call for assistance.  Hourly rounding in place.

## 2021-08-31 NOTE — PROGRESS NOTES
Attending Hospitalist today is Dr. Colon.  Please call this physician for orders, updates, or questions.

## 2021-09-01 ENCOUNTER — PHARMACY VISIT (OUTPATIENT)
Dept: PHARMACY | Facility: MEDICAL CENTER | Age: 51
End: 2021-09-01
Payer: COMMERCIAL

## 2021-09-01 ENCOUNTER — PATIENT OUTREACH (OUTPATIENT)
Dept: HEALTH INFORMATION MANAGEMENT | Facility: OTHER | Age: 51
End: 2021-09-01

## 2021-09-01 VITALS
WEIGHT: 230.6 LBS | HEIGHT: 60 IN | OXYGEN SATURATION: 96 % | RESPIRATION RATE: 17 BRPM | HEART RATE: 56 BPM | BODY MASS INDEX: 45.27 KG/M2 | SYSTOLIC BLOOD PRESSURE: 148 MMHG | DIASTOLIC BLOOD PRESSURE: 76 MMHG | TEMPERATURE: 97.4 F

## 2021-09-01 PROBLEM — R79.89 ELEVATED TROPONIN: Status: RESOLVED | Noted: 2021-08-31 | Resolved: 2021-09-01

## 2021-09-01 PROBLEM — I16.0 HYPERTENSIVE URGENCY: Status: RESOLVED | Noted: 2021-08-31 | Resolved: 2021-09-01

## 2021-09-01 LAB
CHOLEST SERPL-MCNC: 191 MG/DL (ref 100–199)
GLUCOSE BLD-MCNC: 203 MG/DL (ref 65–99)
GLUCOSE BLD-MCNC: 219 MG/DL (ref 65–99)
HDLC SERPL-MCNC: 49 MG/DL
LDLC SERPL CALC-MCNC: 110 MG/DL
TRIGL SERPL-MCNC: 159 MG/DL (ref 0–149)

## 2021-09-01 PROCEDURE — 700111 HCHG RX REV CODE 636 W/ 250 OVERRIDE (IP): Performed by: STUDENT IN AN ORGANIZED HEALTH CARE EDUCATION/TRAINING PROGRAM

## 2021-09-01 PROCEDURE — G0378 HOSPITAL OBSERVATION PER HR: HCPCS

## 2021-09-01 PROCEDURE — A9270 NON-COVERED ITEM OR SERVICE: HCPCS | Performed by: NURSE PRACTITIONER

## 2021-09-01 PROCEDURE — 82962 GLUCOSE BLOOD TEST: CPT

## 2021-09-01 PROCEDURE — 96372 THER/PROPH/DIAG INJ SC/IM: CPT

## 2021-09-01 PROCEDURE — A9270 NON-COVERED ITEM OR SERVICE: HCPCS | Performed by: HOSPITALIST

## 2021-09-01 PROCEDURE — RXMED WILLOW AMBULATORY MEDICATION CHARGE: Performed by: NURSE PRACTITIONER

## 2021-09-01 PROCEDURE — 80061 LIPID PANEL: CPT

## 2021-09-01 PROCEDURE — 99217 PR OBSERVATION CARE DISCHARGE: CPT | Performed by: STUDENT IN AN ORGANIZED HEALTH CARE EDUCATION/TRAINING PROGRAM

## 2021-09-01 PROCEDURE — 96376 TX/PRO/DX INJ SAME DRUG ADON: CPT

## 2021-09-01 PROCEDURE — A9270 NON-COVERED ITEM OR SERVICE: HCPCS | Performed by: STUDENT IN AN ORGANIZED HEALTH CARE EDUCATION/TRAINING PROGRAM

## 2021-09-01 PROCEDURE — 700102 HCHG RX REV CODE 250 W/ 637 OVERRIDE(OP): Performed by: STUDENT IN AN ORGANIZED HEALTH CARE EDUCATION/TRAINING PROGRAM

## 2021-09-01 PROCEDURE — 700102 HCHG RX REV CODE 250 W/ 637 OVERRIDE(OP): Performed by: HOSPITALIST

## 2021-09-01 PROCEDURE — RXMED WILLOW AMBULATORY MEDICATION CHARGE: Performed by: STUDENT IN AN ORGANIZED HEALTH CARE EDUCATION/TRAINING PROGRAM

## 2021-09-01 PROCEDURE — 700102 HCHG RX REV CODE 250 W/ 637 OVERRIDE(OP): Performed by: NURSE PRACTITIONER

## 2021-09-01 RX ORDER — GLUCOSAMINE HCL/CHONDROITIN SU 500-400 MG
CAPSULE ORAL
Qty: 100 EACH | Refills: 0 | Status: SHIPPED | OUTPATIENT
Start: 2021-09-01 | End: 2022-03-16

## 2021-09-01 RX ORDER — FUROSEMIDE 20 MG/1
20 TABLET ORAL 2 TIMES DAILY
Qty: 60 TABLET | Refills: 0 | Status: SHIPPED | OUTPATIENT
Start: 2021-09-01 | End: 2021-10-28 | Stop reason: SDUPTHER

## 2021-09-01 RX ORDER — ATORVASTATIN CALCIUM 40 MG/1
40 TABLET, FILM COATED ORAL EVERY EVENING
Qty: 30 TABLET | Refills: 0 | Status: SHIPPED | OUTPATIENT
Start: 2021-09-01 | End: 2021-10-01

## 2021-09-01 RX ORDER — CARVEDILOL 3.12 MG/1
3.12 TABLET ORAL 2 TIMES DAILY WITH MEALS
Qty: 60 TABLET | Refills: 0 | Status: SHIPPED | OUTPATIENT
Start: 2021-09-01 | End: 2021-10-01

## 2021-09-01 RX ORDER — NYSTATIN 100000 [USP'U]/G
POWDER TOPICAL 2 TIMES DAILY
Status: DISCONTINUED | OUTPATIENT
Start: 2021-09-01 | End: 2021-09-01 | Stop reason: HOSPADM

## 2021-09-01 RX ORDER — LANCETS 30 GAUGE
EACH MISCELLANEOUS
Qty: 100 EACH | Refills: 0 | Status: SHIPPED | OUTPATIENT
Start: 2021-09-01 | End: 2022-03-16

## 2021-09-01 RX ORDER — FUROSEMIDE 10 MG/ML
20 INJECTION INTRAMUSCULAR; INTRAVENOUS 2 TIMES DAILY
Qty: 120 ML | Refills: 0 | Status: SHIPPED | OUTPATIENT
Start: 2021-09-01 | End: 2021-09-01

## 2021-09-01 RX ORDER — LEVOTHYROXINE SODIUM 0.03 MG/1
25 TABLET ORAL
Qty: 30 TABLET | Refills: 0 | Status: SHIPPED | OUTPATIENT
Start: 2021-09-02 | End: 2021-10-02

## 2021-09-01 RX ORDER — LOSARTAN POTASSIUM 25 MG/1
25 TABLET ORAL DAILY
Qty: 30 TABLET | Refills: 0 | Status: SHIPPED | OUTPATIENT
Start: 2021-09-02 | End: 2021-10-02

## 2021-09-01 RX ADMIN — METFORMIN HYDROCHLORIDE 500 MG: 500 TABLET ORAL at 08:51

## 2021-09-01 RX ADMIN — CARVEDILOL 3.12 MG: 3.12 TABLET, FILM COATED ORAL at 08:51

## 2021-09-01 RX ADMIN — ASPIRIN 81 MG: 81 TABLET, COATED ORAL at 06:31

## 2021-09-01 RX ADMIN — DOCUSATE SODIUM 50 MG AND SENNOSIDES 8.6 MG 2 TABLET: 8.6; 5 TABLET, FILM COATED ORAL at 06:29

## 2021-09-01 RX ADMIN — INSULIN LISPRO 3 UNITS: 100 INJECTION, SOLUTION INTRAVENOUS; SUBCUTANEOUS at 06:49

## 2021-09-01 RX ADMIN — LEVOTHYROXINE SODIUM 25 MCG: 0.03 TABLET ORAL at 06:33

## 2021-09-01 RX ADMIN — CLONIDINE HYDROCHLORIDE 0.1 MG: 0.1 TABLET ORAL at 00:22

## 2021-09-01 RX ADMIN — INSULIN LISPRO 7 UNITS: 100 INJECTION, SOLUTION INTRAVENOUS; SUBCUTANEOUS at 12:10

## 2021-09-01 RX ADMIN — FUROSEMIDE 20 MG: 10 INJECTION, SOLUTION INTRAMUSCULAR; INTRAVENOUS at 06:31

## 2021-09-01 RX ADMIN — INSULIN LISPRO 3 UNITS: 100 INJECTION, SOLUTION INTRAVENOUS; SUBCUTANEOUS at 12:09

## 2021-09-01 RX ADMIN — HEPARIN SODIUM 5000 UNITS: 5000 INJECTION, SOLUTION INTRAVENOUS; SUBCUTANEOUS at 06:29

## 2021-09-01 RX ADMIN — INSULIN LISPRO 7 UNITS: 100 INJECTION, SOLUTION INTRAVENOUS; SUBCUTANEOUS at 06:48

## 2021-09-01 RX ADMIN — LOSARTAN POTASSIUM 25 MG: 50 TABLET, FILM COATED ORAL at 06:52

## 2021-09-01 RX ADMIN — NYSTATIN: 100000 POWDER TOPICAL at 08:51

## 2021-09-01 SDOH — ECONOMIC STABILITY: TRANSPORTATION INSECURITY
IN THE PAST 12 MONTHS, HAS LACK OF TRANSPORTATION KEPT YOU FROM MEETINGS, WORK, OR FROM GETTING THINGS NEEDED FOR DAILY LIVING?: NO

## 2021-09-01 SDOH — ECONOMIC STABILITY: FOOD INSECURITY: WITHIN THE PAST 12 MONTHS, YOU WORRIED THAT YOUR FOOD WOULD RUN OUT BEFORE YOU GOT MONEY TO BUY MORE.: NEVER TRUE

## 2021-09-01 SDOH — ECONOMIC STABILITY: FOOD INSECURITY: WITHIN THE PAST 12 MONTHS, THE FOOD YOU BOUGHT JUST DIDN'T LAST AND YOU DIDN'T HAVE MONEY TO GET MORE.: NEVER TRUE

## 2021-09-01 SDOH — ECONOMIC STABILITY: TRANSPORTATION INSECURITY
IN THE PAST 12 MONTHS, HAS THE LACK OF TRANSPORTATION KEPT YOU FROM MEDICAL APPOINTMENTS OR FROM GETTING MEDICATIONS?: NO

## 2021-09-01 ASSESSMENT — FIBROSIS 4 INDEX: FIB4 SCORE: 1.36

## 2021-09-01 NOTE — PROGRESS NOTES
4 Eyes Skin Assessment Completed by GIOVANNA Remy and Lc RN.    Head WDL  Ears WDL  Nose WDL  Mouth WDL  Neck WDL  Breast/Chest Redness and Rash, under both breasts.  Shoulder Blades WDL  Spine WDL  (R) Arm/Elbow/Hand WDL, Pt does have deformities in fingers.  (L) Arm/Elbow/Hand WDL, Pt does have deformities in fingers.  Abdomen WDL  Groin WDL  Scrotum/Coccyx/Buttocks WDL  (R) Leg Edema  (L) Leg Edema  (R) Heel/Foot/Toe WDL, except for scaly,dryness  (L) Heel/Foot/Toe WDL, except for scaly, dryness          Devices In Places Tele Box      Interventions In Place InterDry and Pillows    Possible Skin Injury No    Pictures Uploaded Into Epic N/A  Wound Consult Placed N/A  RN Wound Prevention Protocol Ordered No

## 2021-09-01 NOTE — HEART FAILURE PROGRAM
Patient with heart failure living in a motel. Did not get refills of her meds so was in hypertensive crisis. Not clear if she didn't have refills of her prescriptions or she couldn't get to the pharmacy. She has Medicaid so there should be no copay for her meds. She has an EF of 45% and grade III diastolic dysfunction with a restrictive pattern. This is concerning.    I am working on getting a Community Health Worker assigned to her and getting her an appointment at the [facility]. I will also ask the clinic to arrange a ride to the appointment that we make for her.     Placed an order for social work asking that they please find a pharmacy that is nearby to the motel so that she can  refills easily.    Placed an order for registered dietician to provide diet education and ideas and resources for eating heart healthy given patient's living situation.    GDMT:    QUICK SUMMARY OF HFpEF MEASURES    • Anticoagulation for atrial arrhythmia: not diagnosed  • Glycemic control for DM + HF: insulin and metformin  • Lipid lowering medication for DM + HF: atorvastatin  • Pneumococcal vaccine: MISSING  • Influenza vaccine for current season: n/a  • Smoking cessation counseling documented: never smoker per H&P    Thank you, Eli, Cardio RN Navigator [phone]

## 2021-09-01 NOTE — HOSPITAL COURSE
Ms. Sun is a 51-year-old female with a PMHx of HTN, DMT2, homelessness, who presented on 8/31/121 due to several days of episodic headache, worsening over the past 2 days.  Patient reports that she has been homeless for approximately couple months and had stopped taking her blood pressure medication as she ran out.  Due to her social situation, patient did not seek medical attention or see any doctor.  Patient currently residing in a motel with her daughter and presented herself to the ER due to her severe headache.  Patient denies any chest pain, no palpitations, no fever, no chills, and no general weakness.  Patient admits to exertional dyspnea and increased lower edema swelling.    In ER, patient's blood pressure was 256/134 which improved after labetalol and Vasotec administration.  Patient's troponin slightly elevated due to uncontrolled hypertension, , CXR noted cardiomegaly with bilateral vascular congestion.  CT of the head obtained which noted no acute intracranial hemorrhage identified with mild patchy hypodensity.  An echocardiogram was also obtained which reported LVEF approximately 45%.    During this course of stay, patient's blood with medication was monitored and controlled.  Patient was given extensive education in regards to diagnosis of diabetes with A1c at 10.4 along with early stages of heart failure due to echocardiogram at 45%.  Patient also educated in regards to controlling blood pressure with medication and staying compliant.  Patient established with cardiology through our facility and has an upcoming scheduled appointment along with PCP which is scheduled towards the end of the month.  Patient has recommended to follow-up with all appointments for management and refills of medication.  Patient at this time declining insulin therapy for management of her diabetes and would prefer to stick to oral antihyperglycemic and diet control.  Patient given education in regards to nutrition  and diet choices.  Patient educated on cardiac and diabetic diet.  All questions and concerns answered prior to being discharged.  Patient discharged home.

## 2021-09-01 NOTE — CARE PLAN
The patient is Stable - Low risk of patient condition declining or worsening    Shift Goals  Clinical Goals: monitor bp and pain control  Patient Goals: comfort  Family Goals: not present      Problem: Knowledge Deficit - Standard  Goal: Patient and family/care givers will demonstrate understanding of plan of care, disease process/condition, diagnostic tests and medications  Outcome: Progressing     Problem: Pain - Standard  Goal: Alleviation of pain or a reduction in pain to the patient’s comfort goal  Outcome: Progressing

## 2021-09-01 NOTE — PROGRESS NOTES
Report received from Vonda RN.  Patient sitting up in bed relaxing.  POC gone over with the patient and all questions answered.  Patient A & O  X 4.  No apparent signs of distress.  Safety precautions in place.  Patient educated to call for assistance.  Hourly rounding in place.

## 2021-09-01 NOTE — CARE PLAN
Problem: Knowledge Deficit - Standard  Goal: Patient and family/care givers will demonstrate understanding of plan of care, disease process/condition, diagnostic tests and medications  Outcome: Progressing     Problem: Pain - Standard  Goal: Alleviation of pain or a reduction in pain to the patient’s comfort goal  Outcome: Progressing   The patient is Stable - Low risk of patient condition declining or worsening    Shift Goals  Clinical Goals: Monitor B/P and Glucose  Patient Goals: comfort  Family Goals: not present    Progress made toward(s) clinical / shift goals:  Pt has no pain and B/P is controlled.    Patient is not progressing towards the following goals: N/A

## 2021-09-01 NOTE — DISCHARGE PLANNING
Meds-to-Beds: Discharge prescription orders listed below delivered to patient's bedside. GIOVANNA Remy notified. Patient counseled.      Current Outpatient Medications   Medication Sig Dispense Refill   • atorvastatin (LIPITOR) 40 MG Tab Take 1 Tablet by mouth every evening for 30 days. 30 Tablet 0   • carvedilol (COREG) 3.125 MG Tab Take 1 Tablet by mouth 2 times a day with meals for 30 days. 60 Tablet 0   • [START ON 9/2/2021] levothyroxine (SYNTHROID) 25 MCG Tab Take 1 Tablet by mouth every morning on an empty stomach for 30 days. 30 Tablet 0   • [START ON 9/2/2021] losartan (COZAAR) 25 MG Tab Take 1 Tablet by mouth every day for 30 days. 30 Tablet 0   • metformin (GLUCOPHAGE) 1000 MG tablet Take 1 Tablet by mouth 2 times a day with meals for 30 days. 60 Tablet 0   • Empagliflozin 10 MG Tab Take 1 tablet by mouth every day for 30 days. 30 Tablet 0        Lexi Guajardo, PharmD

## 2021-09-01 NOTE — HEART FAILURE PROGRAM
Spoke with CHW Mitali Wyatt who saw patient this morning. Patient provided with pantry information and she has a car that has petrol and does not anticipate not being able to make her appointment at the [facility] Clinic.    Mitali will be following the patient in the community.     I met Vilam at the bedside at 1000 and provided extensive HF education. Using teachback method on the following topics     Meds  Activity  Weights  Diet  Daily monitoring for worsening signs and symptoms  What to do about worsening signs and sypmtoms: CALL [phone]     Dagobertocecilio was instructed that acting upon yellow zone worsening signs and symptoms means calling [phone].     Vilma was instructed that acting upon red zone symptoms means calling 911.    SCALE: Provided a scale to Vilma.    Vilma was able to participate successfully in teachback on all topics     Vilma will be able to get to her appointment at the [facility] clinic tomorrow.     Thank you and please call EDER Benitez with questions M-F

## 2021-09-01 NOTE — PROGRESS NOTES
Community Health Worker Intake  • Social determinates of health intake : Complete  • Identified barriers to : Housing.  • Contact information provided to Vilma Sun Yes  • Has PCP appointment scheduled for : New PCP 10/07/2021/ Cardiology 09/02/2021  • Scheduled Food Delivery/Home Visit/Outpatient Visit: No  • Accepted/Declined Meds-To-Beds.Yes  • Inpatient assessment completed.    JUAN J Wyatt met with patient bedside and introduced Community Care Management and completed SDOH . Patient lives with her  and three kids in a motel. Patient is an active . Patient states she came to the hospital because she was out of her medications. Patient does not have a current PCP and was not able to get refills as her primary care physician was in [facility]. Patient is need of affordable housing. Patient states she has enough food. Patient relies on 's unemployment for household income.     Plan:  JUAN J Wyatt provided affordable housing and advised patient of MTM services ( Verified on [employer] web site) as patient states she gets low on gas money and discussed the [facility]. JUAN J Wyatt scheduled all appointments with patient bedside. New PCP 10/07/2021 @ 1:45 pm / Cardiology 09/02/2021 @ 11:30 am. All appointments will print out on AVS.  JUAN J Wyatt provided all contact / resources bedside.

## 2021-09-01 NOTE — CARE PLAN
Problem: Knowledge Deficit - Standard  Goal: Patient and family/care givers will demonstrate understanding of plan of care, disease process/condition, diagnostic tests and medications  Outcome: Progressing     Problem: Pain - Standard  Goal: Alleviation of pain or a reduction in pain to the patient’s comfort goal  Outcome: Progressing   The patient is Stable - Low risk of patient condition declining or worsening    Shift Goals  Clinical Goals: B/P and pain control  Patient Goals: Decrease pain and B/P    Progress made toward(s) clinical / shift goals:  Pt blood pressure is much better controlled, as well as her pain has decreased.    Patient is not progressing towards the following goals: N/A

## 2021-09-01 NOTE — PROGRESS NOTES
Assessment completed. Pt A&Ox 4, and patient was sleeping quietly prior to assessment. Respirations are even and unlabored on room air. Pt denies pain at this time. Monitors applied, VS stable, call light and belongings within reach. POC updated (Monitor BP). Pt educated on room and call light, pt verbalized understanding. Communication board updated. Needs met.

## 2021-09-01 NOTE — PROGRESS NOTES
Monitor summary: Per Monitor Room    Sinus rhythm rate 60-65   Ectopy: Rare PVC's  0.16/0.07/0.47

## 2021-09-01 NOTE — DISCHARGE PLANNING
Meds-to-Beds: Discharge prescription orders listed below delivered to patient's bedside. RN notified. Patient counseled. CDE will be providing patient with glucometer.      Current Outpatient Medications   Medication Sig Dispense Refill   • glucose blood strip (Truemetrix) Use one strip to test blood sugar 3 times a day before meals and at bedtime. 50 Strip 0   • Lancets (Techlite) Use one  lancet to test blood sugar 3 times a day before meals and at bedtime 100 Each 0   • Alcohol Swabs Wipe site with prep pad prior to injection. 100 Each 0      Tsering Slater, PharmD

## 2021-09-01 NOTE — DISCHARGE SUMMARY
Discharge Summary    CHIEF COMPLAINT ON ADMISSION  Chief Complaint   Patient presents with   • Hypertension   • Head Ache   • Medication Refill       Reason for Admission  Med refill     Admission Date  8/31/2021    CODE STATUS  Full Code    HPI & HOSPITAL COURSE     Ms. Sun is a 51-year-old female with a PMHx of HTN, DMT2, homelessness, who presented on 8/31/121 due to several days of episodic headache, worsening over the past 2 days.  Patient reports that she has been homeless for approximately couple months and had stopped taking her blood pressure medication as she ran out.  Due to her social situation, patient did not seek medical attention or see any doctor.  Patient currently residing in a motel with her daughter and presented herself to the ER due to her severe headache.  Patient denies any chest pain, no palpitations, no fever, no chills, and no general weakness.  Patient admits to exertional dyspnea and increased lower edema swelling.    In ER, patient's blood pressure was 256/134 which improved after labetalol and Vasotec administration.  Patient's troponin slightly elevated due to uncontrolled hypertension, , CXR noted cardiomegaly with bilateral vascular congestion.  CT of the head obtained which noted no acute intracranial hemorrhage identified with mild patchy hypodensity.  An echocardiogram was also obtained which reported LVEF approximately 45%.    During this course of stay, patient's blood with medication was monitored and controlled.  Patient was given extensive education in regards to diagnosis of diabetes with A1c at 10.4 along with early stages of heart failure due to echocardiogram at 45%.  Patient also educated in regards to controlling blood pressure with medication and staying compliant.  Patient established with cardiology through our facility and has an upcoming scheduled appointment along with PCP which is scheduled towards the end of the month.  Patient has recommended to  follow-up with all appointments for management and refills of medication.  Patient at this time declining insulin therapy for management of her diabetes and would prefer to stick to oral antihyperglycemic and diet control.  Patient given education in regards to nutrition and diet choices.  Patient educated on cardiac and diabetic diet.  All questions and concerns answered prior to being discharged.  Patient discharged home.      Therefore, she is discharged in good and stable condition to home with close outpatient follow-up.    The patient recovered much more quickly than anticipated on admission.    Discharge Date  09/01/21      FOLLOW UP ITEMS POST DISCHARGE  Please call [phone] to schedule PCP appointment for patient.    Required specialty appointments include:       Discharge Instructions per NUBIA MonahanRJo AnnN.  => Follow-up with establish new PCP towards the end of September  => Follow-up with cardiology on 9/2/2021 at 1345  => Stay compliant with all medication  => Patient medications all refilled, new addition of Jardiance to help with diabetes  => Monitor your blood sugar and blood pressure daily prior to taking medication  => Weight loss program    DIET: Cardiac and diabetic    ACTIVITY: As tolerated    DIAGNOSIS: Headache    Return to ER if symptoms persist, chest pain, palpitations, shortness of breath, numbness, tingling, weakness, and high fevers.      DISCHARGE DIAGNOSES  Principal Problem (Resolved):    Hypertensive urgency POA: Unknown  Active Problems:    CHF (congestive heart failure) (HCC) POA: Unknown    Uncontrolled diabetes mellitus (HCC) POA: Unknown    Class 3 severe obesity in adult (HCC) POA: Unknown    At risk for obstructive sleep apnea POA: Unknown    HLD (hyperlipidemia) POA: Unknown    Homelessness POA: Unknown  Resolved Problems:    Elevated troponin POA: Unknown      FOLLOW UP  Future Appointments   Date Time Provider Department Center   9/2/2021  1:45 PM  RED K[facility] None   10/7/2021 11:30 AM Dulce Lizarraga P.A.-C. [facility]    Go on 9/2/2021  at 13:45    [facility] [address]  [address], [address]  [place] [address]  [phone]  Schedule an appointment as soon as possible for a visit in 1 month        MEDICATIONS ON DISCHARGE     Medication List      START taking these medications      Instructions   atorvastatin 40 MG Tabs  Commonly known as: LIPITOR   Take 1 Tablet by mouth every evening for 30 days.  Dose: 40 mg     carvedilol 3.125 MG Tabs  Commonly known as: COREG   Take 1 Tablet by mouth 2 times a day with meals for 30 days.  Dose: 3.125 mg     Empagliflozin 10 MG Tabs   Take 10 mg by mouth every day for 30 days.  Dose: 10 mg     furosemide 10 MG/ML Soln  Commonly known as: LASIX   Infuse 2 mL into a venous catheter 2 times a day for 30 days.  Dose: 20 mg     levothyroxine 25 MCG Tabs  Start taking on: September 2, 2021  Commonly known as: SYNTHROID   Take 1 Tablet by mouth every morning on an empty stomach for 30 days.  Dose: 25 mcg     losartan 25 MG Tabs  Start taking on: September 2, 2021  Commonly known as: COZAAR   Take 1 Tablet by mouth every day for 30 days.  Dose: 25 mg     metFORMIN 500 MG Tabs  Commonly known as: GLUCOPHAGE   Take 2 Tablets by mouth 2 times a day with meals for 30 days.  Dose: 1,000 mg            Allergies  No Known Allergies    DIET  Orders Placed This Encounter   Procedures   • Diet Order Diet: Consistent CHO (Diabetic); Second Modifier: (optional): Cardiac     Standing Status:   Standing     Number of Occurrences:   1     Order Specific Question:   Diet:     Answer:   Consistent CHO (Diabetic) [4]     Order Specific Question:   Second Modifier: (optional)     Answer:   Cardiac [6]       ACTIVITY  As tolerated.  Weight bearing as tolerated    CONSULTATIONS  NONE    PROCEDURES  NONE    IMAGING  EC-ECHOCARDIOGRAM COMPLETE W/  CONT   Final Result      CT-HEAD W/O   Final Result      No acute intracranial hemorrhage is identified.      Mild patchy hypodensity is present.  This is a nonspecific finding which often is found to represent chronic microvascular disease.  Demyelination, age indeterminant ischemia and gliosis are also common possibilities.      DX-CHEST-PORTABLE (1 VIEW)   Final Result      Cardiac silhouette enlargement with minor fissure visualization that may indicate minimal interstitial edema            LABORATORY  Lab Results   Component Value Date    SODIUM 136 08/31/2021    POTASSIUM 3.8 08/31/2021    CHLORIDE 99 08/31/2021    CO2 23 08/31/2021    GLUCOSE 341 (H) 08/31/2021    BUN 21 08/31/2021    CREATININE 0.84 08/31/2021        Lab Results   Component Value Date    WBC 9.0 08/31/2021    HEMOGLOBIN 14.3 08/31/2021    HEMATOCRIT 44.3 08/31/2021    PLATELETCT 301 08/31/2021        Total time of the discharge process exceeds 36 minutes.    ==========================================================================================================  Please note that this dictation was created using voice recognition software. I have made every reasonable attempt to correct obvious errors, but there may be errors of grammar and possibly content that I did not discover before finalizing the note.    Electronically signed by:  HENRY Escobar, MSN, APRN, FNP-C  Hospitalist Services  [facility]  [phone]  [email or web address]  09/01/21      [id number]

## 2021-09-01 NOTE — DISCHARGE INSTRUCTIONS
FOLLOW UP ITEMS POST DISCHARGE    Discharge Instructions per Seerna Escobar A.P.R.N.  => Follow-up with establish new PCP towards the end of September  => Follow-up with cardiology on 9/2/2021 at 1345  => Stay compliant with all medication  => Patient medications all refilled, new addition of Jardiance to help with diabetes  => Monitor your blood sugar and blood pressure daily prior to taking medication  => Weight loss program    DIET: Cardiac and diabetic    ACTIVITY: As tolerated    DIAGNOSIS: Headache    Return to ER if symptoms persist, chest pain, palpitations, shortness of breath, numbness, tingling, weakness, and high fevers.    Discharge Instructions    Discharged to home by car with relative. Discharged via wheelchair, hospital escort: Yes.  Special equipment needed: Not Applicable    Be sure to schedule a follow-up appointment with your primary care doctor or any specialists as instructed.     Discharge Plan:   Diet Plan: Discussed  Activity Level: Discussed  Confirmed Follow up Appointment: Patient to Call and Schedule Appointment  Confirmed Symptoms Management: Discussed  Medication Reconciliation Updated: Yes    I understand that a diet low in cholesterol, fat, and sodium is recommended for good health. Unless I have been given specific instructions below for another diet, I accept this instruction as my diet prescription.   Other diet: Diabetic, Cardiac    Special Instructions: None    · Is patient discharged on Warfarin / Coumadin?   No     Depression / Suicide Risk    As you are discharged from this [facility] facility, it is important to learn how to keep safe from harming yourself.    Recognize the warning signs:  · Abrupt changes in personality, positive or negative- including increase in energy   · Giving away possessions  · Change in eating patterns- significant weight changes-  positive or negative  · Change in sleeping patterns- unable to sleep or sleeping all the  time   · Unwillingness or inability to communicate  · Depression  · Unusual sadness, discouragement and loneliness  · Talk of wanting to die  · Neglect of personal appearance   · Rebelliousness- reckless behavior  · Withdrawal from people/activities they love  · Confusion- inability to concentrate     If you or a loved one observes any of these behaviors or has concerns about self-harm, here's what you can do:  · Talk about it- your feelings and reasons for harming yourself  · Remove any means that you might use to hurt yourself (examples: pills, rope, extension cords, firearm)  · Get professional help from the community (Mental Health, Substance Abuse, psychological counseling)  · Do not be alone:Call your Safe Contact- someone whom you trust who will be there for you.  · Call your local CRISIS HOTLINE 540-7489 or 716-506-7353  · Call your local Children's Mobile Crisis Response Team [place] (518) 015-1825 or www.[employer]  · Call the toll free National Suicide Prevention Hotlines   · National Suicide Prevention Lifeline 069-014-FOJL (0432)  · National Hope Line Network 800-SUICIDE (417-4990)

## 2021-09-01 NOTE — CONSULTS
Diabetes education: Pt has a hx of diabetes previously on Metformin. Pt was admitted with blood sugar of 341 and Hg a1c of 10.4%.   Pt is currently on Semglee 10 units pm and Admelog 7 units tid meals with Admelog sliding scale coverage ac and hs. Blood sugars are 214  ( 3 units), 219 ( 3 units+7 units) and  203 ( 3 units +7 units).  Per note pt does not want insulin so going home on oral agents.  CDE reviewed diabetes , hyper and hypoglycemia, what effects blood sugars and goals for blood sugars. Reviewed need for protein and controlled carbohydrates with meals. Reviewed Metformin and Jardiance and how to take ( as well as need for water). True metrix meter given and instructed. [facility] DM book given and reviewed. Questions answered.

## 2021-09-02 ENCOUNTER — OFFICE VISIT (OUTPATIENT)
Dept: CARDIOLOGY | Facility: MEDICAL CENTER | Age: 51
End: 2021-09-02
Payer: MEDICAID

## 2021-09-02 ENCOUNTER — TELEPHONE (OUTPATIENT)
Dept: CARDIOLOGY | Facility: MEDICAL CENTER | Age: 51
End: 2021-09-02

## 2021-09-02 VITALS
WEIGHT: 232 LBS | HEART RATE: 77 BPM | RESPIRATION RATE: 14 BRPM | SYSTOLIC BLOOD PRESSURE: 126 MMHG | BODY MASS INDEX: 45.55 KG/M2 | DIASTOLIC BLOOD PRESSURE: 80 MMHG | HEIGHT: 60 IN | OXYGEN SATURATION: 93 %

## 2021-09-02 DIAGNOSIS — E78.5 HYPERLIPIDEMIA, UNSPECIFIED HYPERLIPIDEMIA TYPE: ICD-10-CM

## 2021-09-02 DIAGNOSIS — I10 ESSENTIAL HYPERTENSION, BENIGN: ICD-10-CM

## 2021-09-02 DIAGNOSIS — E11.649 UNCONTROLLED TYPE 2 DIABETES MELLITUS WITH HYPOGLYCEMIA WITHOUT COMA (HCC): ICD-10-CM

## 2021-09-02 DIAGNOSIS — I50.42 CHRONIC COMBINED SYSTOLIC AND DIASTOLIC CONGESTIVE HEART FAILURE (HCC): ICD-10-CM

## 2021-09-02 DIAGNOSIS — I50.20 ACC/AHA STAGE C SYSTOLIC HEART FAILURE (HCC): ICD-10-CM

## 2021-09-02 DIAGNOSIS — Z59.00 HOMELESSNESS: ICD-10-CM

## 2021-09-02 DIAGNOSIS — Z79.899 HIGH RISK MEDICATION USE: ICD-10-CM

## 2021-09-02 DIAGNOSIS — E66.01 CLASS 3 SEVERE OBESITY DUE TO EXCESS CALORIES WITH SERIOUS COMORBIDITY AND BODY MASS INDEX (BMI) OF 45.0 TO 49.9 IN ADULT (HCC): ICD-10-CM

## 2021-09-02 PROCEDURE — 99204 OFFICE O/P NEW MOD 45 MIN: CPT | Performed by: NURSE PRACTITIONER

## 2021-09-02 SDOH — ECONOMIC STABILITY - HOUSING INSECURITY: HOMELESSNESS UNSPECIFIED: Z59.00

## 2021-09-02 ASSESSMENT — ENCOUNTER SYMPTOMS
MYALGIAS: 0
ABDOMINAL PAIN: 0
NAUSEA: 0
COUGH: 0
FEVER: 0
FALLS: 0
WEIGHT LOSS: 0
TINGLING: 0
PALPITATIONS: 0
BLURRED VISION: 0
PND: 0
DIZZINESS: 0
CLAUDICATION: 0
BRUISES/BLEEDS EASILY: 0
BLOOD IN STOOL: 0
ORTHOPNEA: 0
SHORTNESS OF BREATH: 1
VOMITING: 0
LOSS OF CONSCIOUSNESS: 0

## 2021-09-02 ASSESSMENT — MINNESOTA LIVING WITH HEART FAILURE QUESTIONNAIRE (MLHF)
DIFFICULTY WITH SEXUAL ACTIVITIES: 5
TIRED, FATIGUED OR LOW ON ENERGY: 5
DIFFICULTY SLEEPING WELL AT NIGHT: 5
DIFFICULTY SOCIALIZING WITH FAMILY OR FRIENDS: 3
GIVING YOU SIDE EFFECTS FROM TREATMENTS: 0
COSTING YOU MONEY FOR MEDICAL CARE: 0
HAVING TO SIT OR LIE DOWN DURING THE DAY: 5
TOTAL_SCORE: 73
WORKING AROUND THE HOUSE OR YARD DIFFICULT: 4
DIFFICULTY WORKING TO EARN A LIVING: 5
DIFFICULTY TO CONCENTRATE OR REMEMBERING THINGS: 4
MAKING YOU STAY IN A HOSPITAL: 1
MAKING YOU FEEL DEPRESSED: 3
LOSS OF SELF CONTROL IN YOUR LIFE: 1
DIFFICULTY GOING AWAY FROM HOME: 3
WALKING ABOUT OR CLIMBING STAIRS DIFFICULT: 5
SWELLING IN ANKLES OR LEGS: 5
FEELING LIKE A BURDEN TO FAMILY AND FRIENDS: 3
DIFFICULTY WITH RECREATIONAL PASTIMES, SPORTS, HOBBIES: 4
MAKING YOU WORRY: 3
EATING LESS FOODS YOU LIKE: 4
MAKING YOU SHORT OF BREATH: 5

## 2021-09-02 ASSESSMENT — FIBROSIS 4 INDEX: FIB4 SCORE: 1.36

## 2021-09-02 NOTE — PROGRESS NOTES
Chief Complaint   Patient presents with   • Congestive Heart Failure   • Hyperlipidemia       Subjective     Vilma Sun is a 51 y.o. female who presents today heart failure new after recent discharge on 9/1/2021 for newly reduced ejection fraction and heart failure symptoms..  Patient was admitted from 8/31-9/1.    In addition to above patient has past medical history of hypertension.  Patient has been homeless since March 2021 and notes financial barriers to obtaining  Medication.  Patient also reports persistent HUIZAR since 2015, but notes lack of access to primary care in [place].    Today, patient reports improved, but maintains HUIZAR. Patient denies chest pain, shortness of breath, palpitations, dizziness/lightheadedness, orthopnea, PND or Edema. Patient reports that she can climb 1 flight of stairs until she experiences HUIZAR. Patient reports positive diet changes at home for decrease sodium and weight loss.  Patient was recently discharged yesterday, she has not been able to check her weights her blood pressures to assess for trending data.     We reviewed medications and provided written instructions regarding the importance of her heart failure medications; patient verbalizes understanding.  We will also refer to heart failure education as well as pharmacotherapy clinic for additional resources.    Today, based on physical examination findings, patient is euvolemic. No JVD, lungs are clear to auscultation, no pitting edema in bilateral lower extremities, no ascites. Dry weight is  approximately 230 lbs.    History reviewed. No pertinent past medical history.  History reviewed. No pertinent surgical history.  History reviewed. No pertinent family history.  Social History     Socioeconomic History   • Marital status:      Spouse name: Not on file   • Number of children: Not on file   • Years of education: Not on file   • Highest education level: Not on file   Occupational History   • Not on file    Tobacco Use   • Smoking status: Never Smoker   • Smokeless tobacco: Never Used   Substance and Sexual Activity   • Alcohol use: Not Currently   • Drug use: Not Currently   • Sexual activity: Not on file   Other Topics Concern   • Not on file   Social History Narrative   • Not on file     Social Determinants of Health     Financial Resource Strain:    • Difficulty of Paying Living Expenses:    Food Insecurity: No Food Insecurity   • Worried About Running Out of Food in the Last Year: Never true   • Ran Out of Food in the Last Year: Never true   Transportation Needs: No Transportation Needs   • Lack of Transportation (Medical): No   • Lack of Transportation (Non-Medical): No   Physical Activity:    • Days of Exercise per Week:    • Minutes of Exercise per Session:    Stress:    • Feeling of Stress :    Social Connections:    • Frequency of Communication with Friends and Family:    • Frequency of Social Gatherings with Friends and Family:    • Attends Zoroastrian Services:    • Active Member of Clubs or Organizations:    • Attends Club or Organization Meetings:    • Marital Status:    Intimate Partner Violence:    • Fear of Current or Ex-Partner:    • Emotionally Abused:    • Physically Abused:    • Sexually Abused:      No Known Allergies  Outpatient Encounter Medications as of 9/2/2021   Medication Sig Dispense Refill   • atorvastatin (LIPITOR) 40 MG Tab Take 1 Tablet by mouth every evening for 30 days. 30 Tablet 0   • carvedilol (COREG) 3.125 MG Tab Take 1 Tablet by mouth 2 times a day with meals for 30 days. 60 Tablet 0   • levothyroxine (SYNTHROID) 25 MCG Tab Take 1 Tablet by mouth every morning on an empty stomach for 30 days. 30 Tablet 0   • losartan (COZAAR) 25 MG Tab Take 1 Tablet by mouth every day for 30 days. 30 Tablet 0   • metformin (GLUCOPHAGE) 1000 MG tablet Take 1 Tablet by mouth 2 times a day with meals for 30 days. 60 Tablet 0   • Empagliflozin 10 MG Tab Take 1 tablet by mouth every day for 30 days.  30 Tablet 0   • glucose blood strip Use one strip to test blood sugar 3 times a day before meals and at bedtime. 50 Strip 0   • Lancets Use one  lancet to test blood sugar 3 times a day before meals and at bedtime 100 Each 0   • Alcohol Swabs Wipe site with prep pad prior to injection. 100 Each 0   • furosemide (LASIX) 20 MG Tab Take 1 Tablet by mouth 2 times a day. 60 Tablet 0     No facility-administered encounter medications on file as of 9/2/2021.     Review of Systems   Constitutional: Negative for fever, malaise/fatigue and weight loss.   Eyes: Negative for blurred vision.   Respiratory: Positive for shortness of breath. Negative for cough.    Cardiovascular: Negative for chest pain, palpitations, orthopnea, claudication, leg swelling and PND.   Gastrointestinal: Negative for abdominal pain, blood in stool, nausea and vomiting.   Genitourinary: Negative for dysuria, frequency and hematuria.   Musculoskeletal: Negative for falls and myalgias.   Neurological: Negative for dizziness, tingling and loss of consciousness.   Endo/Heme/Allergies: Does not bruise/bleed easily.              Objective     /80 (BP Location: Left arm, Patient Position: Sitting, BP Cuff Size: Adult)   Pulse 77   Resp 14   Ht 1.524 m (5')   Wt 105 kg (232 lb)   LMP 08/17/2021 (Approximate)   SpO2 93%   BMI 45.31 kg/m²     Physical Exam   Constitutional: She is oriented to person, place, and time. She appears well-developed.   HENT:   Head: Normocephalic and atraumatic.   Eyes: Pupils are equal, round, and reactive to light.   Neck: No JVD present.   Cardiovascular: Normal rate, regular rhythm and normal heart sounds. Exam reveals no gallop and no friction rub.   No murmur heard.  Pulmonary/Chest: Effort normal and breath sounds normal. No respiratory distress.   Abdominal: Soft. Bowel sounds are normal. She exhibits no distension.   Neurological: She is alert and oriented to person, place, and time.   Skin: Skin is warm and dry.  No erythema.   Psychiatric: Her behavior is normal. Mood normal.   Vitals reviewed.         Lab Results   Component Value Date/Time    CHOLSTRLTOT 191 09/01/2021 04:35 AM     (H) 09/01/2021 04:35 AM    HDL 49 09/01/2021 04:35 AM    TRIGLYCERIDE 159 (H) 09/01/2021 04:35 AM       Lab Results   Component Value Date/Time    SODIUM 136 08/31/2021 12:55 AM    POTASSIUM 3.8 08/31/2021 12:55 AM    CHLORIDE 99 08/31/2021 12:55 AM    CO2 23 08/31/2021 12:55 AM    GLUCOSE 341 (H) 08/31/2021 12:55 AM    BUN 21 08/31/2021 12:55 AM    CREATININE 0.84 08/31/2021 12:55 AM     Lab Results   Component Value Date/Time    ALKPHOSPHAT 116 (H) 08/31/2021 03:18 AM    ASTSGOT 41 08/31/2021 03:18 AM    ALTSGPT 26 08/31/2021 03:18 AM    TBILIRUBIN 0.2 08/31/2021 03:18 AM      Results for CANDIDO ALMONTE (MRN [id number]) as of 9/2/2021 15:42   Ref. Range 8/31/2021 00:55   NT-proBNP Latest Ref Range: 0 - 125 pg/mL 526 (H)     Transthoracic echocardiogram (8/31/2021): No prior study is available for comparison.   Reduced left ventricular systolic function. Left ventricular ejection   fraction is visually estimated to be 45%  Moderate aortic insufficiency. Pressure half time is 403 msec.  Normal aortic root for body surface area. Ascending aorta diameter is   2.8 cm.    EKG 8/21/2021: Personally interpreted by me as sinus rhythm        Assessment & Plan     1. Chronic combined systolic and diastolic congestive heart failure (HCC)  REFERRAL TO PHARMACOTHERAPY SERVICE    REFERRAL TO CARDIOLOGY - HEART FAILURE NURSING EDUCATION    Basic Metabolic Panel   2. Uncontrolled type 2 diabetes mellitus with hypoglycemia without coma (HCC)  REFERRAL TO PHARMACOTHERAPY SERVICE    REFERRAL TO CARDIOLOGY - HEART FAILURE NURSING EDUCATION    Basic Metabolic Panel   3. Class 3 severe obesity due to excess calories with serious comorbidity and body mass index (BMI) of 45.0 to 49.9 in adult (HCC)  REFERRAL TO PHARMACOTHERAPY SERVICE    REFERRAL TO  CARDIOLOGY - HEART FAILURE NURSING EDUCATION    Basic Metabolic Panel   4. Homelessness  REFERRAL TO PHARMACOTHERAPY SERVICE    REFERRAL TO CARDIOLOGY - HEART FAILURE NURSING EDUCATION    Basic Metabolic Panel   5. Hyperlipidemia, unspecified hyperlipidemia type  REFERRAL TO PHARMACOTHERAPY SERVICE    REFERRAL TO CARDIOLOGY - HEART FAILURE NURSING EDUCATION    Basic Metabolic Panel       Medical Decision Making: Today's Assessment/Status/Plan:        Combined HFpEF anf HFrEF, Stage C, Class 3, LVEF 45%  -Heart failure due to unknown etiology.  Negative UDS on 8/31/2021.  Stress test ordered.  -ACE-I/ARB/ARNI: Continue losartan 25 mg daily  -Evidence Based Beta-blocker: Continue carvedilol 3.125 mg twice daily  -Aldosterone Antagonist: Consider initiation at next appointment with BMP prior to initiation.  -SGLT2-I: Jardiance 10 mg daily for T2DM  -Diuretic: Furosemide 20 mg twice daily  -Labs: BMP in 2 weeks  -Repeat Echo in 3 months, if LVEF not >35%, then will discuss/consider ICD for primary Prevention.   -Reinforced s/sx of worsening heart failure with patient and weight monitoring. Pt verbalizes understanding. Pt to call office if present.    -Heart Failure Education: pt to be contacted by HF nurse for further education,  -Advanced care planning: Advanced directive and POLST deferred to next appointment.  -Pharmacotherapy clinic referral for heart failure GDMT and T2DM  -We will also temporarily provide handicap placard for 3 months while patient is experiencing persistent HUIZAR with short distances.    Moderate aortic Stenosis  - Asymptoamtic, BP stable, appears euvolemic.   - AS appears stable without clinic evidence of progression of symptoms. Could consider repeat echo if s/sx, pt to call office if occurs.  -CTM    Hypertension  -Today in office blood pressure is well controlled.    -Encouraged to continue home BP monitoring/log.  -Medication recommendations per above.    Pulmonary hypertension likely group  2  -Diuretics per above    Metabolic syndrome - T2DM; hyperlipidemia  -[id number].  Not at goal.  Continue atorvastatin 40 mg daily  -A1c 10.4 on 8/31/2021  -Jardiance  -Metformin  -Pharmacotherapy clinic in the interim.  Patient to establish with PCP     High Risk Medication Use  -This includes losartan, furosemide, Jardiance  -Will continue to closely monitor for side effects of patient's high risk medication(s) including liver, renal function and electrolytes  -Close monitoring discussed with patient.  Lab work ordered.    FU in clinic in 1 month. Sooner if needed.    Patient verbalizes understanding and agrees with the plan of care.     Collaborating MD: Dr. Martin MACEDO    PLEASE NOTE: This Note was created using voice recognition Software. I have made every reasonable attempt to correct obvious errors, but I expect that there are errors of grammar and possibly content that I did not discover before finalizing the note

## 2021-09-02 NOTE — TELEPHONE ENCOUNTER
HALLIE Kee.  Wil Munoz R.N.  Can we complete a form to get this patient a handicap placards?   Thank you!       Paperwork completed and signed by JOSH, left message for patient to call back to discuss when she is coming to pick it up.

## 2021-09-03 ENCOUNTER — PATIENT OUTREACH (OUTPATIENT)
Dept: HEALTH INFORMATION MANAGEMENT | Facility: OTHER | Age: 51
End: 2021-09-03

## 2021-09-03 NOTE — PROGRESS NOTES
JUAN J Wyatt called patient via mobile phone for a post hospital follow up and left a voicemail providing all contact information if patient would need a further assistance.  JUAN J Wyatt will discharge patient from [facility]  And remove from case load and master list as all goals have been met.

## 2021-09-07 ENCOUNTER — DOCUMENTATION (OUTPATIENT)
Dept: VASCULAR LAB | Facility: MEDICAL CENTER | Age: 51
End: 2021-09-07

## 2021-09-07 NOTE — PROGRESS NOTES
[facility]      Called and left msg for pt to call back to establish care regarding CHF, DM & lipid referral from Naz BENTLEY on 9/2/21.         Insurance: [place] [employer]   PCP: Joanne = Dulce LEON  Locations to be seen: [facility]     Phone number left for return call or any questions or concerns.  [facility] at [phone], fax [phone]      Yary Song  PharmD

## 2021-09-12 NOTE — DOCUMENTATION QUERY
[facility]                                                                       Query Response Note      PATIENT:               CANDIDO ALMONTE  ACCT #:                  [id number]  MRN:                     [id number]  :                      1970  ADMIT DATE:       2021 12:30 AM  DISCH DATE:        2021 4:54 PM  RESPONDING  PROVIDER #:        [id number]           QUERY TEXT:    Uncontrolled diabetes is documented in the medical record.  There is no code in ICD 10 for uncontrolled diabetes.  Further clarification is needed:      NOTE:  If an appropriate response is not listed below, please respond with a new note.                The patient's Clinical Indicators include:  Clinical indicators:  -hemoglobin A1c of 10.4    -Glucose Labs results:  - - 341, 256, 231, 214   - - 219, 203    Treatment or Monitoring:  -started on Metformin and lantus  -diabetic education and labs    Risk Factors:  -hypertension  -chf  -obesity        Thank you,  [email or web address]  Options provided:   -- Uncontrolled diabetes meaning with hypoglycemia   -- Uncontrolled diabetes meaning with hyperglycemia   -- Findings of no clinical significance   -- Unable to determine      Query created by: Caroline Loaiza on 9/10/2021 3:41 PM    RESPONSE TEXT:    Uncontrolled diabetes meaning with hypoglycemia          Electronically signed by:  MC ZAMORA MD 2021 7:05 AM

## 2021-09-14 ENCOUNTER — DOCUMENTATION (OUTPATIENT)
Dept: VASCULAR LAB | Facility: MEDICAL CENTER | Age: 51
End: 2021-09-14

## 2021-09-14 NOTE — PROGRESS NOTES
[facility]        Called and spoke to the patient to establish care regarding CHF, DM & lipid referral from Naz BENTLEY on 9/2/21.     Patient scheduled initial appt 10/4/21 10am      Insurance: [place] Medicaid   PCP: Joanne = Dulce LEON  Locations to be seen: [facility]     Phone number left for return call or any questions or concerns.  [facility] at [phone], fax [phone]        Yary RodriguezD

## 2021-09-16 ENCOUNTER — HOSPITAL ENCOUNTER (OUTPATIENT)
Dept: RADIOLOGY | Facility: MEDICAL CENTER | Age: 51
End: 2021-09-16
Attending: NURSE PRACTITIONER
Payer: MEDICAID

## 2021-09-16 PROCEDURE — A9502 TC99M TETROFOSMIN: HCPCS

## 2021-09-16 PROCEDURE — 78452 HT MUSCLE IMAGE SPECT MULT: CPT | Mod: 26 | Performed by: INTERNAL MEDICINE

## 2021-09-16 PROCEDURE — 93018 CV STRESS TEST I&R ONLY: CPT | Performed by: INTERNAL MEDICINE

## 2021-10-04 ENCOUNTER — APPOINTMENT (OUTPATIENT)
Dept: CARDIOLOGY | Facility: MEDICAL CENTER | Age: 51
End: 2021-10-04
Payer: MEDICAID

## 2021-10-05 ENCOUNTER — TELEPHONE (OUTPATIENT)
Dept: CARDIOLOGY | Facility: MEDICAL CENTER | Age: 51
End: 2021-10-05

## 2021-10-05 RX ORDER — LOSARTAN POTASSIUM 25 MG/1
25 TABLET ORAL DAILY
Qty: 30 TABLET | Refills: 11 | Status: SHIPPED | OUTPATIENT
Start: 2021-10-05

## 2021-10-05 RX ORDER — LOSARTAN POTASSIUM 25 MG/1
25 TABLET ORAL DAILY
COMMUNITY
End: 2021-10-05 | Stop reason: SDUPTHER

## 2021-10-08 ENCOUNTER — PHARMACY VISIT (OUTPATIENT)
Dept: PHARMACY | Facility: MEDICAL CENTER | Age: 51
End: 2021-10-08
Payer: COMMERCIAL

## 2021-10-08 PROCEDURE — RXMED WILLOW AMBULATORY MEDICATION CHARGE: Performed by: NURSE PRACTITIONER

## 2021-10-26 ENCOUNTER — TELEPHONE (OUTPATIENT)
Dept: SCHEDULING | Facility: IMAGING CENTER | Age: 51
End: 2021-10-26

## 2021-10-28 ENCOUNTER — PHARMACY VISIT (OUTPATIENT)
Dept: PHARMACY | Facility: MEDICAL CENTER | Age: 51
End: 2021-10-28
Payer: COMMERCIAL

## 2021-10-28 ENCOUNTER — NON-PROVIDER VISIT (OUTPATIENT)
Dept: CARDIOLOGY | Facility: MEDICAL CENTER | Age: 51
End: 2021-10-28
Payer: MEDICAID

## 2021-10-28 VITALS
WEIGHT: 230 LBS | SYSTOLIC BLOOD PRESSURE: 191 MMHG | DIASTOLIC BLOOD PRESSURE: 83 MMHG | BODY MASS INDEX: 44.92 KG/M2 | HEART RATE: 77 BPM

## 2021-10-28 DIAGNOSIS — E11.649 UNCONTROLLED TYPE 2 DIABETES MELLITUS WITH HYPOGLYCEMIA WITHOUT COMA (HCC): ICD-10-CM

## 2021-10-28 DIAGNOSIS — E78.5 HYPERLIPIDEMIA, UNSPECIFIED HYPERLIPIDEMIA TYPE: ICD-10-CM

## 2021-10-28 DIAGNOSIS — I50.42 CHRONIC COMBINED SYSTOLIC AND DIASTOLIC CONGESTIVE HEART FAILURE (HCC): ICD-10-CM

## 2021-10-28 PROCEDURE — 99211 OFF/OP EST MAY X REQ PHY/QHP: CPT | Performed by: NURSE PRACTITIONER

## 2021-10-28 PROCEDURE — RXMED WILLOW AMBULATORY MEDICATION CHARGE: Performed by: NURSE PRACTITIONER

## 2021-10-28 RX ORDER — CARVEDILOL 6.25 MG/1
6.25 TABLET ORAL 2 TIMES DAILY WITH MEALS
Qty: 60 TABLET | Refills: 11 | Status: SHIPPED | OUTPATIENT
Start: 2021-10-28

## 2021-10-28 RX ORDER — FUROSEMIDE 20 MG/1
20 TABLET ORAL 2 TIMES DAILY
Qty: 60 TABLET | Refills: 11 | Status: SHIPPED | OUTPATIENT
Start: 2021-10-28

## 2021-10-28 RX ORDER — ATORVASTATIN CALCIUM 40 MG/1
40 TABLET, FILM COATED ORAL DAILY
Qty: 30 TABLET | Refills: 11 | Status: SHIPPED | OUTPATIENT
Start: 2021-10-28

## 2021-10-28 ASSESSMENT — FIBROSIS 4 INDEX: FIB4 SCORE: 1.36

## 2021-10-28 NOTE — NON-PROVIDER
CHF Pharmacotherapy visit - Visit    Date of Service: 10/28/21    Informed written consent was given on: 10/28/21    Vilma Sun is here for CHF, DM, and lipids    HPI  Pertinent Interval History since last visit:   • Pt's baseline visit w/ pharmacotherapy services    Most recent EF:  45% (8/2021)      Current Outpatient Medications:   •  furosemide, 20 mg, Oral, BID  •  atorvastatin, 40 mg, Oral, DAILY  •  metformin, 1,000 mg, Oral, BID WITH MEALS  •  carvedilol, 6.25 mg, Oral, BID WITH MEALS  •  losartan, 25 mg, Oral, DAILY  •  Empagliflozin, 10 mg, Oral, DAILY  •  glucose blood, Use one strip to test blood sugar 3 times a day before meals and at bedtime.  •  Lancets, Use one  lancet to test blood sugar 3 times a day before meals and at bedtime  •  Alcohol Swabs, Wipe site with prep pad prior to injection.    Current Adherence to CHF Therapies:  Partial - pt was running low on carvedilol, so she was taking intermittently. She is otherwise compliant w/ all of her medications.    Current CHF Medications - including dose:   • Entresto or ACE/ARB: Losartan 25 mg once daily  • Beta blocker: Carvedilol 3.125 mg BID  • Diuretic: Furosemide 20 mg BID  • Aldosterone antagonist: N/a    Current diabetes medications:  • Metformin 1000 mg BID  • Jardiance 10 mg once daily  • SMFBG: Pt does test, but unable to recall readings today  • Last retinal exam: 2 yrs ago. Pt to schedule prior to f/u    Current lipid medications:  • Atorvastatin 40 mg once daily    Vitals:    10/28/21 0938   BP: (!) 191/83   Pulse: 77       Home BP and HR:  Pt does not currently monitor d/t not having a scale. She plans to obtain prior to f/u.     Change in weight: Stable    Exercise habits: minimal exercise - Pt states she's always on the go and doing something. She does dedicated walking indoors.    Diet: Relatively low carbohydrate, low sodium    SOCIAL HISTORY  Social History     Tobacco Use   Smoking Status Never Smoker   Smokeless Tobacco  Never Used        DATA REVIEW  Lab Results   Component Value Date/Time    HBA1C 10.4 (H) 08/31/2021 03:18 AM          Lab Results   Component Value Date/Time    CHOLSTRLTOT 191 09/01/2021 04:35 AM     (H) 09/01/2021 04:35 AM    HDL 49 09/01/2021 04:35 AM    TRIGLYCERIDE 159 (H) 09/01/2021 04:35 AM       Lab Results   Component Value Date/Time    SODIUM 136 08/31/2021 12:55 AM    POTASSIUM 3.8 08/31/2021 12:55 AM    CHLORIDE 99 08/31/2021 12:55 AM    CO2 23 08/31/2021 12:55 AM    GLUCOSE 341 (H) 08/31/2021 12:55 AM    BUN 21 08/31/2021 12:55 AM    CREATININE 0.84 08/31/2021 12:55 AM     Lab Results   Component Value Date/Time    ALKPHOSPHAT 116 (H) 08/31/2021 03:18 AM    ASTSGOT 41 08/31/2021 03:18 AM    ALTSGPT 26 08/31/2021 03:18 AM    TBILIRUBIN 0.2 08/31/2021 03:18 AM    ALBUMIN 3.7 08/31/2021 03:18 AM      No components found for: MICROALBUMINCREATRATIOURINE    Renal function:  Calculated creatinine clearance: ~87 ml/min     Other:  There is no immunization history on file for this patient.  Up to date on pneumococcal vaccine? No - pt declines today. Will address at f/u.      Recent Imaging Studies:    Recent imaging studies, including cardiac US, were available in EMR and reviewed with patient at today's visit      ASSESSMENT AND PLAN  • Pt presents to clinic to establish care w/ pharmacotherapy services. She lives in a motel currently.   • Provided basic education on CHF, preventing exacerbation, documenting daily weights and BP, importance of medication adherence  • Basic physiology of DMII was explained to patient as well as microvascular/macrovascular complications. The importance of increasing physical activity to improve diabetes control was discussed with the patient. Patient was also educated on changing diet and making better choices to help control blood sugar.  • Counseled pt regarding all of her medications, indications, MOA's, SE's, and administration.  • Pt was significantly hypertensive in  clinic today - she denies HA, CP, dizziness, etc.. She states she did take her medications this AM, but admits she's been taking her carvedilol intermittently d/t running low on tablet supply    Resulting CHF medications today (changes are bolded)  • Entresto or ACE/ARB: Losartan 25 mg once daily  • Beta blocker: Increase carvedilol to 6.25 mg BID  • Diuretic: Continue furosemide 20 mg BID  • Aldosterone antagonist: N/a    Diabetes medications (changes are bolded)  • Continue current therapy pending SMBG readings    lipid medications (changes are bolded)  • Continue atorvastatin 40 mg once daily    Lifestyle Recommendations From Today's Visit:   • Continue to eat DASH/MED style diet.   • Continue to exercise as tolerated.     Significant changes to laboratory values since last visit that require repeat labs:  • N/a    Blood Work Ordered At Today's visit:   None    Studies Ordered at Todays Visit:  Microalbumin:creatinine     Follow-Up:   3 weeks    Williams Traore, PharmD      CC:  Dulce Lizarraga P.A.-C.  No ref. provider found    xMedications reconciled  xFlow sheets updated

## 2021-10-28 NOTE — PATIENT INSTRUCTIONS
Increase your carvedilol to 6.25 mg twice daily (breakfast and dinner)    Start monitoring your blood pressure and pulse at home    Start keeping a log of your blood sugars - test in the morning prior to food    Scheduled your eye exam    Go get your labs done    Go get flu shot, covid shot, and pneumonia shot

## 2021-11-08 PROCEDURE — RXMED WILLOW AMBULATORY MEDICATION CHARGE: Performed by: NURSE PRACTITIONER

## 2021-11-09 ENCOUNTER — PHARMACY VISIT (OUTPATIENT)
Dept: PHARMACY | Facility: MEDICAL CENTER | Age: 51
End: 2021-11-09
Payer: COMMERCIAL

## 2021-11-09 ENCOUNTER — HOSPITAL ENCOUNTER (OUTPATIENT)
Dept: LAB | Facility: MEDICAL CENTER | Age: 51
End: 2021-11-09
Attending: NURSE PRACTITIONER
Payer: MEDICAID

## 2021-11-09 DIAGNOSIS — E11.649 UNCONTROLLED TYPE 2 DIABETES MELLITUS WITH HYPOGLYCEMIA WITHOUT COMA (HCC): ICD-10-CM

## 2021-11-09 DIAGNOSIS — E66.01 CLASS 3 SEVERE OBESITY DUE TO EXCESS CALORIES WITH SERIOUS COMORBIDITY AND BODY MASS INDEX (BMI) OF 45.0 TO 49.9 IN ADULT (HCC): ICD-10-CM

## 2021-11-09 DIAGNOSIS — I50.42 CHRONIC COMBINED SYSTOLIC AND DIASTOLIC CONGESTIVE HEART FAILURE (HCC): ICD-10-CM

## 2021-11-09 DIAGNOSIS — Z59.00 HOMELESSNESS: ICD-10-CM

## 2021-11-09 DIAGNOSIS — E78.5 HYPERLIPIDEMIA, UNSPECIFIED HYPERLIPIDEMIA TYPE: ICD-10-CM

## 2021-11-09 LAB
ANION GAP SERPL CALC-SCNC: 12 MMOL/L (ref 7–16)
BUN SERPL-MCNC: 14 MG/DL (ref 8–22)
CALCIUM SERPL-MCNC: 9.1 MG/DL (ref 8.5–10.5)
CHLORIDE SERPL-SCNC: 97 MMOL/L (ref 96–112)
CO2 SERPL-SCNC: 24 MMOL/L (ref 20–33)
CREAT SERPL-MCNC: 0.78 MG/DL (ref 0.5–1.4)
CREAT UR-MCNC: 84.37 MG/DL
GLUCOSE SERPL-MCNC: 264 MG/DL (ref 65–99)
MICROALBUMIN UR-MCNC: 6 MG/DL
MICROALBUMIN/CREAT UR: 71 MG/G (ref 0–30)
POTASSIUM SERPL-SCNC: 4.3 MMOL/L (ref 3.6–5.5)
SODIUM SERPL-SCNC: 133 MMOL/L (ref 135–145)

## 2021-11-09 PROCEDURE — 80048 BASIC METABOLIC PNL TOTAL CA: CPT

## 2021-11-09 PROCEDURE — 82043 UR ALBUMIN QUANTITATIVE: CPT

## 2021-11-09 PROCEDURE — 36415 COLL VENOUS BLD VENIPUNCTURE: CPT

## 2021-11-09 PROCEDURE — 82570 ASSAY OF URINE CREATININE: CPT

## 2021-11-09 SDOH — ECONOMIC STABILITY - HOUSING INSECURITY: HOMELESSNESS UNSPECIFIED: Z59.00

## 2021-11-12 ENCOUNTER — OFFICE VISIT (OUTPATIENT)
Dept: CARDIOLOGY | Facility: MEDICAL CENTER | Age: 51
End: 2021-11-12
Payer: MEDICAID

## 2021-11-12 ENCOUNTER — PHARMACY VISIT (OUTPATIENT)
Dept: PHARMACY | Facility: MEDICAL CENTER | Age: 51
End: 2021-11-12
Payer: COMMERCIAL

## 2021-11-12 VITALS
DIASTOLIC BLOOD PRESSURE: 82 MMHG | SYSTOLIC BLOOD PRESSURE: 120 MMHG | BODY MASS INDEX: 45.75 KG/M2 | WEIGHT: 233 LBS | HEIGHT: 60 IN | OXYGEN SATURATION: 97 % | HEART RATE: 73 BPM

## 2021-11-12 DIAGNOSIS — E78.5 HYPERLIPIDEMIA, UNSPECIFIED HYPERLIPIDEMIA TYPE: ICD-10-CM

## 2021-11-12 DIAGNOSIS — Z59.00 HOMELESSNESS: ICD-10-CM

## 2021-11-12 DIAGNOSIS — Z79.899 HIGH RISK MEDICATION USE: ICD-10-CM

## 2021-11-12 DIAGNOSIS — E66.01 CLASS 3 SEVERE OBESITY DUE TO EXCESS CALORIES WITH SERIOUS COMORBIDITY AND BODY MASS INDEX (BMI) OF 45.0 TO 49.9 IN ADULT (HCC): ICD-10-CM

## 2021-11-12 DIAGNOSIS — I42.8 NONISCHEMIC CARDIOMYOPATHY (HCC): ICD-10-CM

## 2021-11-12 DIAGNOSIS — I10 ESSENTIAL HYPERTENSION, BENIGN: ICD-10-CM

## 2021-11-12 DIAGNOSIS — I50.9 CONGESTIVE HEART FAILURE, UNSPECIFIED HF CHRONICITY, UNSPECIFIED HEART FAILURE TYPE (HCC): ICD-10-CM

## 2021-11-12 PROCEDURE — 99214 OFFICE O/P EST MOD 30 MIN: CPT | Performed by: NURSE PRACTITIONER

## 2021-11-12 PROCEDURE — RXMED WILLOW AMBULATORY MEDICATION CHARGE: Performed by: NURSE PRACTITIONER

## 2021-11-12 RX ORDER — SPIRONOLACTONE 25 MG/1
25 TABLET ORAL DAILY
Qty: 100 TABLET | Refills: 2 | Status: SHIPPED | OUTPATIENT
Start: 2021-11-12

## 2021-11-12 SDOH — ECONOMIC STABILITY - HOUSING INSECURITY: HOMELESSNESS UNSPECIFIED: Z59.00

## 2021-11-12 ASSESSMENT — ENCOUNTER SYMPTOMS
VOMITING: 0
SHORTNESS OF BREATH: 0
ABDOMINAL PAIN: 0
PALPITATIONS: 0
BLOOD IN STOOL: 0
FEVER: 0
WEIGHT LOSS: 0
FALLS: 0
TINGLING: 0
LOSS OF CONSCIOUSNESS: 0
COUGH: 0
ORTHOPNEA: 0
DIZZINESS: 0
CLAUDICATION: 0
NAUSEA: 0
BLURRED VISION: 0
PND: 0
MYALGIAS: 0
BRUISES/BLEEDS EASILY: 0

## 2021-11-12 ASSESSMENT — FIBROSIS 4 INDEX: FIB4 SCORE: 1.36

## 2021-11-12 NOTE — PROGRESS NOTES
Chief Complaint   Patient presents with   • Congestive Heart Failure   • Hyperlipidemia       Subjective     Vilma Sun is a 51 y.o. female who presents today for heart failure follow-up.  Patient was last seen on 9/3/2021, after recent discharge on 9/1/2021 for newly reduced ejection fraction and heart failure symptoms. Patient was admitted from 8/31-9/1. Was last seen by pharmacotherapy on 10/28/2021    In addition to above patient has past medical history of hypertension.  Patient has been homeless since March 2021 and notes financial barriers to obtaining  Medication.  Patient also reports persistent HUIZAR since 2015, but notes lack of access to primary care in [place].    Today, patient reports she is doing well, she has been supporting her  in the hospital who has been recovering from COVID.  Patient reports she has not been watching sodium in her diet and sometimes forgets her afternoon medications.  Patient reports increased lower extremity swelling if she forgets her afternoon Lasix dose.  Otherwise, patient denies chest pain, shortness of breath, palpitations, dizziness/lightheadedness, orthopnea, PND.  Patient reports she recently obtained a blood pressure cuff her blood pressure ranges from 160s to 180s/90s -100s.  Her blood pressure check cuff was checked in office today it is approximately 20 points higher than her in office blood pressure.  We discussed the importance of monitoring sodium and medication changes per below.    Today, based on physical examination findings, patient is euvolemic. No JVD, lungs are clear to auscultation, no pitting edema in bilateral lower extremities, no ascites. Dry weight is 230 lbs.     We discussed the results of her stress test and BMP per below.    History reviewed. No pertinent past medical history.  History reviewed. No pertinent surgical history.  History reviewed. No pertinent family history.  Social History     Socioeconomic History   • Marital  status:      Spouse name: Not on file   • Number of children: Not on file   • Years of education: Not on file   • Highest education level: Not on file   Occupational History   • Not on file   Tobacco Use   • Smoking status: Never Smoker   • Smokeless tobacco: Never Used   Substance and Sexual Activity   • Alcohol use: Not Currently   • Drug use: Not Currently   • Sexual activity: Not on file   Other Topics Concern   • Not on file   Social History Narrative   • Not on file     Social Determinants of Health     Financial Resource Strain:    • Difficulty of Paying Living Expenses: Not on file   Food Insecurity: No Food Insecurity   • Worried About Running Out of Food in the Last Year: Never true   • Ran Out of Food in the Last Year: Never true   Transportation Needs: No Transportation Needs   • Lack of Transportation (Medical): No   • Lack of Transportation (Non-Medical): No   Physical Activity:    • Days of Exercise per Week: Not on file   • Minutes of Exercise per Session: Not on file   Stress:    • Feeling of Stress : Not on file   Social Connections:    • Frequency of Communication with Friends and Family: Not on file   • Frequency of Social Gatherings with Friends and Family: Not on file   • Attends Sabianism Services: Not on file   • Active Member of Clubs or Organizations: Not on file   • Attends Club or Organization Meetings: Not on file   • Marital Status: Not on file   Intimate Partner Violence:    • Fear of Current or Ex-Partner: Not on file   • Emotionally Abused: Not on file   • Physically Abused: Not on file   • Sexually Abused: Not on file   Housing Stability:    • Unable to Pay for Housing in the Last Year: Not on file   • Number of Places Lived in the Last Year: Not on file   • Unstable Housing in the Last Year: Not on file     No Known Allergies  Outpatient Encounter Medications as of 11/12/2021   Medication Sig Dispense Refill   • spironolactone (ALDACTONE) 25 MG Tab Take 1 Tablet by mouth  every day. 100 Tablet 2   • furosemide (LASIX) 20 MG Tab Take 1 Tablet by mouth 2 times a day. 60 Tablet 11   • atorvastatin (LIPITOR) 40 MG Tab Take 1 Tablet by mouth every day. 30 Tablet 11   • metformin (GLUCOPHAGE) 1000 MG tablet Take 1 Tablet by mouth 2 times a day with meals. 60 Tablet 11   • carvedilol (COREG) 6.25 MG Tab Take 1 Tablet by mouth 2 times a day with meals. 60 Tablet 11   • losartan (COZAAR) 25 MG Tab Take 1 Tablet by mouth every day. 30 Tablet 11   • Empagliflozin 10 MG Tab Take 1 tablet by mouth every day. 30 Tablet 11   • glucose blood strip Use one strip to test blood sugar 3 times a day before meals and at bedtime. 50 Strip 0   • Lancets Use one  lancet to test blood sugar 3 times a day before meals and at bedtime 100 Each 0   • Alcohol Swabs Wipe site with prep pad prior to injection. 100 Each 0     No facility-administered encounter medications on file as of 11/12/2021.     Review of Systems   Constitutional: Negative for fever, malaise/fatigue and weight loss.   Eyes: Negative for blurred vision.   Respiratory: Negative for cough and shortness of breath.    Cardiovascular: Positive for leg swelling. Negative for chest pain, palpitations, orthopnea, claudication and PND.   Gastrointestinal: Negative for abdominal pain, blood in stool, nausea and vomiting.   Genitourinary: Negative for dysuria, frequency and hematuria.   Musculoskeletal: Negative for falls and myalgias.   Neurological: Negative for dizziness, tingling and loss of consciousness.   Endo/Heme/Allergies: Does not bruise/bleed easily.              Objective     /82 (BP Location: Left arm, Patient Position: Sitting, BP Cuff Size: Adult)   Pulse 73   Ht 1.524 m (5')   Wt 106 kg (233 lb)   SpO2 97%   BMI 45.50 kg/m²     Physical Exam  Vitals reviewed.   Constitutional:       Appearance: She is well-developed.   HENT:      Head: Normocephalic and atraumatic.   Eyes:      Pupils: Pupils are equal, round, and reactive to  light.   Neck:      Vascular: No JVD.   Cardiovascular:      Rate and Rhythm: Normal rate and regular rhythm.      Heart sounds: Normal heart sounds. No murmur heard.  No friction rub. No gallop.    Pulmonary:      Effort: Pulmonary effort is normal. No respiratory distress.      Breath sounds: Normal breath sounds.   Abdominal:      General: Bowel sounds are normal. There is no distension.      Palpations: Abdomen is soft.   Musculoskeletal:      Right lower le+ Edema present.      Left lower le+ Edema present.   Skin:     General: Skin is warm and dry.      Findings: No erythema.   Neurological:      Mental Status: She is alert and oriented to person, place, and time.   Psychiatric:         Behavior: Behavior normal.            Lab Results   Component Value Date/Time    CHOLSTRLTOT 191 2021 04:35 AM     (H) 2021 04:35 AM    HDL 49 2021 04:35 AM    TRIGLYCERIDE 159 (H) 2021 04:35 AM       Lab Results   Component Value Date/Time    SODIUM 133 (L) 2021 12:12 PM    POTASSIUM 4.3 2021 12:12 PM    CHLORIDE 97 2021 12:12 PM    CO2 24 2021 12:12 PM    GLUCOSE 264 (H) 2021 12:12 PM    BUN 14 2021 12:12 PM    CREATININE 0.78 2021 12:12 PM     Lab Results   Component Value Date/Time    ALKPHOSPHAT 116 (H) 2021 03:18 AM    ASTSGOT 41 2021 03:18 AM    ALTSGPT 26 2021 03:18 AM    TBILIRUBIN 0.2 2021 03:18 AM      Results for CANDIDO ALMONTE (MRN [id number]) as of 2021 15:42   Ref. Range 2021 00:55   NT-proBNP Latest Ref Range: 0 - 125 pg/mL 526 (H)     Transthoracic echocardiogram (2021): No prior study is available for comparison.   Reduced left ventricular systolic function. Left ventricular ejection   fraction is visually estimated to be 45%  Moderate aortic insufficiency. Pressure half time is 403 msec.  Normal aortic root for body surface area. Ascending aorta diameter is   2.8 cm.    EKG 2021:  Personally interpreted by me as sinus rhythm    NM cardiac stress (9/16/2021): No evidence of significant jeopardized viable myocardium or prior myocardial    infarction.   LV ejection fraction = 46%. Correlate with echocardiogram.   ECG INTERPRETATION   Negative stress ECG for ischemia. Exercise capacity impaired at <6 METS. Very high resting blood pressure with normal blood pressure response.    Assessment & Plan     1. Congestive heart failure, unspecified HF chronicity, unspecified heart failure type ([facility])  Basic Metabolic Panel    EC-ECHOCARDIOGRAM COMPLETE W/O CONT   2. Class 3 severe obesity due to excess calories with serious comorbidity and body mass index (BMI) of 45.0 to 49.9 in adult (HCC)  Basic Metabolic Panel    EC-ECHOCARDIOGRAM COMPLETE W/O CONT   3. Hyperlipidemia, unspecified hyperlipidemia type  Basic Metabolic Panel    EC-ECHOCARDIOGRAM COMPLETE W/O CONT   4. Homelessness  Basic Metabolic Panel    EC-ECHOCARDIOGRAM COMPLETE W/O CONT   5. Nonischemic cardiomyopathy (HCC)     6. High risk medication use     7. Essential hypertension, benign         Medical Decision Making: Today's Assessment/Status/Plan:        Combined HFpEF anf HFrEF, Stage C, Class 3, LVEF 45%  -Heart failure due to unknown etiology.  Negative UDS on 8/31/2021.  Stress test negative.   -ACE-I/ARB/ARNI: Continue losartan 25 mg daily  -Evidence Based Beta-blocker: Continue carvedilol 6.5 mg twice daily  -Aldosterone Antagonist: Initiate spironolactone 25 mg daily.  -SGLT2-I: Jardiance 10 mg daily for T2DM  -Diuretic: Furosemide 20 mg twice daily.  Consider decreasing to daily if patient remains euvolemic at next pharmacotherapy appointment.  -Labs: BMP in 2 weeks  -Repeat Echo ordered, if LVEF not >35%, then will discuss/consider ICD for primary Prevention.   -Reinforced s/sx of worsening heart failure with patient and weight monitoring. Pt verbalizes understanding. Pt to call office if present.    -Heart Failure Education: pt to  be contacted by HF nurse for further education,  -Advanced care planning: Advanced directive and POLST deferred to next appointment.  -Pharmacotherapy clinic referral for heart failure GDMT and T2DM  -We will also temporarily provide handicap placard for 3 months while patient is experiencing persistent HUIZAR with short distances.    Moderate aortic Stenosis  - Asymptoamtic, BP stable, appears euvolemic.   - AS appears stable without clinic evidence of progression of symptoms. Could consider repeat echo if s/sx, pt to call office if occurs.  -CTM    Hypertension  -Today in office blood pressure is controlled.    -Home blood pressure reported as 140s to 160s systolically after blood pressure cuff correction.    -Medication changes per above    Pulmonary hypertension likely group 2  -Diuretics per above    Metabolic syndrome - T2DM; hyperlipidemia; BMI 45  -[id number].  Not at goal.  Continue atorvastatin 40 mg daily  -A1c 10.4 on 8/31/2021  -Jardiance  -Metformin  -Pharmacotherapy clinic in the interim.  Patient to establish with PCP     High Risk Medication Use  -This includes losartan, furosemide, Jardiance  -Will continue to closely monitor for side effects of patient's high risk medication(s) including liver, renal function and electrolytes  -Close monitoring discussed with patient.  Lab work ordered.    FU in clinic in 2 months. Sooner if needed.    Patient verbalizes understanding and agrees with the plan of care.     Collaborating MD: Dr. Bonny MD    PLEASE NOTE: This Note was created using voice recognition Software. I have made every reasonable attempt to correct obvious errors, but I expect that there are errors of grammar and possibly content that I did not discover before finalizing the note

## 2021-11-23 ENCOUNTER — DOCUMENTATION (OUTPATIENT)
Dept: VASCULAR LAB | Facility: MEDICAL CENTER | Age: 51
End: 2021-11-23

## 2021-11-23 NOTE — PROGRESS NOTES
Pt missed their f/u HF pharmacotherapy appointment on 11/22.    LVM to reschedule.     Millie Wilson, JenniferD

## 2021-11-30 ENCOUNTER — DOCUMENTATION (OUTPATIENT)
Dept: VASCULAR LAB | Facility: MEDICAL CENTER | Age: 51
End: 2021-11-30

## 2021-11-30 NOTE — PROGRESS NOTES
Pt missed their f/u HF pharmacotherapy appointment on 11/22.     2nd call  LVM to reschedule.     Millie Wilson, JenniferD

## 2021-12-08 ENCOUNTER — TELEPHONE (OUTPATIENT)
Dept: VASCULAR LAB | Facility: MEDICAL CENTER | Age: 51
End: 2021-12-08

## 2021-12-08 NOTE — LETTER
[address]  [address]  [facility] [address]        Dear Vilma Sun ,    We have been unsuccessful in our attempts to contact you regarding your Cardiology Clinical Pharmacist referral.  Please contact us if you would like to schedule an appointment for help managing your heart, blood pressure, blood sugar, and cholesterol medications.    Please contact our clinic so we may assist you.  We are open Monday-Friday 8 am until 5 pm.  You may reach our Service at [phone].        Sincerely,    Gael Collins, JenniferD, [facility]  Clinic Supervisor  [facility]  Outpatient Anticoagulation Service

## 2021-12-08 NOTE — TELEPHONE ENCOUNTER
Called pt regarding missed pharmacotherapy appt - no answer. LVM asking pt to c/b to reschedule.     Will f/u at a later time.    Williams Traore, JenniferD, BCACP

## 2021-12-13 ENCOUNTER — PHARMACY VISIT (OUTPATIENT)
Dept: PHARMACY | Facility: MEDICAL CENTER | Age: 51
End: 2021-12-13
Payer: COMMERCIAL

## 2021-12-13 PROCEDURE — RXMED WILLOW AMBULATORY MEDICATION CHARGE: Performed by: NURSE PRACTITIONER

## 2021-12-15 NOTE — TELEPHONE ENCOUNTER
Attempted to call and s/w pt regarding the above - no answer. LVM.    Williams Traore, PharmD, BCACP

## 2021-12-29 NOTE — TELEPHONE ENCOUNTER
Attempted to f/u w/ pt again - no answer.    Letter sent. Multiple attempts made.    Will await pt contact.     Williams Traore, JenniferD, BCACP

## 2022-01-25 ENCOUNTER — APPOINTMENT (OUTPATIENT)
Dept: CARDIOLOGY | Facility: MEDICAL CENTER | Age: 52
End: 2022-01-25
Payer: MEDICAID

## 2022-02-04 ENCOUNTER — PATIENT MESSAGE (OUTPATIENT)
Dept: CARDIOLOGY | Facility: MEDICAL CENTER | Age: 52
End: 2022-02-04

## 2022-02-04 NOTE — PATIENT COMMUNICATION
Could not get through on PT contact number in order to help schedule appointments. I did sent the PT a [employer] message w/ scheduling contact info.   SLC

## 2022-02-04 NOTE — TELEPHONE ENCOUNTER
----- Message from Cordelia Randall, Stiven Ass't sent at 2/4/2022 11:20 AM PST -----  Regarding: FW: Appointments     ----- Message -----  From: Vilma Sun  Sent: 2/4/2022  11:15 AM PST  To: Mary Crespo/[place]  Subject: Appointments                                     I will need to reschedule all my appointments I missed I was moving into my apartment I’m the only  and I have small children   Thank you   Jocelyn Sun

## 2022-02-08 PROCEDURE — RXMED WILLOW AMBULATORY MEDICATION CHARGE: Performed by: NURSE PRACTITIONER

## 2022-02-11 ENCOUNTER — PHARMACY VISIT (OUTPATIENT)
Dept: PHARMACY | Facility: MEDICAL CENTER | Age: 52
End: 2022-02-11
Payer: COMMERCIAL

## 2022-02-22 ENCOUNTER — TELEPHONE (OUTPATIENT)
Dept: CARDIOLOGY | Facility: MEDICAL CENTER | Age: 52
End: 2022-02-22
Payer: MEDICAID

## 2022-02-22 NOTE — TELEPHONE ENCOUNTER
----- Message from Vilma Sun sent at 2/18/2022 10:09 PM PST -----  Regarding: Appointments   My number is [phone]  Jocelyn Sun

## 2022-03-02 ENCOUNTER — TELEPHONE (OUTPATIENT)
Dept: SCHEDULING | Facility: IMAGING CENTER | Age: 52
End: 2022-03-02

## 2022-03-04 ENCOUNTER — TELEPHONE (OUTPATIENT)
Dept: VASCULAR LAB | Facility: MEDICAL CENTER | Age: 52
End: 2022-03-04
Payer: MEDICAID

## 2022-03-04 NOTE — TELEPHONE ENCOUNTER
Unable to establish care with this pt for CHF.  Pt has not responded to multiple calls/letters.  Will await patient contact.    CC [facility] Francisco, Clinical Pharmacist, CDE, CACP

## 2022-03-15 SDOH — ECONOMIC STABILITY: TRANSPORTATION INSECURITY
IN THE PAST 12 MONTHS, HAS LACK OF TRANSPORTATION KEPT YOU FROM MEETINGS, WORK, OR FROM GETTING THINGS NEEDED FOR DAILY LIVING?: PATIENT DECLINED

## 2022-03-15 SDOH — ECONOMIC STABILITY: FOOD INSECURITY: WITHIN THE PAST 12 MONTHS, YOU WORRIED THAT YOUR FOOD WOULD RUN OUT BEFORE YOU GOT MONEY TO BUY MORE.: PATIENT DECLINED

## 2022-03-15 SDOH — ECONOMIC STABILITY: HOUSING INSECURITY
IN THE LAST 12 MONTHS, WAS THERE A TIME WHEN YOU DID NOT HAVE A STEADY PLACE TO SLEEP OR SLEPT IN A SHELTER (INCLUDING NOW)?: PATIENT REFUSED

## 2022-03-15 SDOH — ECONOMIC STABILITY: TRANSPORTATION INSECURITY
IN THE PAST 12 MONTHS, HAS LACK OF RELIABLE TRANSPORTATION KEPT YOU FROM MEDICAL APPOINTMENTS, MEETINGS, WORK OR FROM GETTING THINGS NEEDED FOR DAILY LIVING?: PATIENT DECLINED

## 2022-03-15 SDOH — ECONOMIC STABILITY: TRANSPORTATION INSECURITY
IN THE PAST 12 MONTHS, HAS THE LACK OF TRANSPORTATION KEPT YOU FROM MEDICAL APPOINTMENTS OR FROM GETTING MEDICATIONS?: PATIENT DECLINED

## 2022-03-15 SDOH — HEALTH STABILITY: PHYSICAL HEALTH: ON AVERAGE, HOW MANY MINUTES DO YOU ENGAGE IN EXERCISE AT THIS LEVEL?: PATIENT DECLINED

## 2022-03-15 SDOH — ECONOMIC STABILITY: HOUSING INSECURITY

## 2022-03-15 SDOH — HEALTH STABILITY: PHYSICAL HEALTH
ON AVERAGE, HOW MANY DAYS PER WEEK DO YOU ENGAGE IN MODERATE TO STRENUOUS EXERCISE (LIKE A BRISK WALK)?: PATIENT DECLINED

## 2022-03-15 SDOH — HEALTH STABILITY: MENTAL HEALTH
STRESS IS WHEN SOMEONE FEELS TENSE, NERVOUS, ANXIOUS, OR CAN'T SLEEP AT NIGHT BECAUSE THEIR MIND IS TROUBLED. HOW STRESSED ARE YOU?: NOT AT ALL

## 2022-03-15 SDOH — ECONOMIC STABILITY: INCOME INSECURITY: HOW HARD IS IT FOR YOU TO PAY FOR THE VERY BASICS LIKE FOOD, HOUSING, MEDICAL CARE, AND HEATING?: PATIENT DECLINED

## 2022-03-15 SDOH — ECONOMIC STABILITY: FOOD INSECURITY: WITHIN THE PAST 12 MONTHS, THE FOOD YOU BOUGHT JUST DIDN'T LAST AND YOU DIDN'T HAVE MONEY TO GET MORE.: PATIENT DECLINED

## 2022-03-15 SDOH — ECONOMIC STABILITY: INCOME INSECURITY: IN THE LAST 12 MONTHS, WAS THERE A TIME WHEN YOU WERE NOT ABLE TO PAY THE MORTGAGE OR RENT ON TIME?: PATIENT REFUSED

## 2022-03-15 ASSESSMENT — SOCIAL DETERMINANTS OF HEALTH (SDOH)
IN A TYPICAL WEEK, HOW MANY TIMES DO YOU TALK ON THE PHONE WITH FAMILY, FRIENDS, OR NEIGHBORS?: NEVER
DO YOU BELONG TO ANY CLUBS OR ORGANIZATIONS SUCH AS CHURCH GROUPS UNIONS, FRATERNAL OR ATHLETIC GROUPS, OR SCHOOL GROUPS?: NO
WITHIN THE PAST 12 MONTHS, YOU WORRIED THAT YOUR FOOD WOULD RUN OUT BEFORE YOU GOT THE MONEY TO BUY MORE: PATIENT DECLINED
HOW OFTEN DO YOU HAVE A DRINK CONTAINING ALCOHOL: NEVER
HOW OFTEN DO YOU ATTENT MEETINGS OF THE CLUB OR ORGANIZATION YOU BELONG TO?: NEVER
HOW MANY DRINKS CONTAINING ALCOHOL DO YOU HAVE ON A TYPICAL DAY WHEN YOU ARE DRINKING: PATIENT DECLINED
HOW OFTEN DO YOU GET TOGETHER WITH FRIENDS OR RELATIVES?: NEVER
HOW OFTEN DO YOU HAVE SIX OR MORE DRINKS ON ONE OCCASION: NEVER
HOW OFTEN DO YOU ATTENT MEETINGS OF THE CLUB OR ORGANIZATION YOU BELONG TO?: NEVER
DO YOU BELONG TO ANY CLUBS OR ORGANIZATIONS SUCH AS CHURCH GROUPS UNIONS, FRATERNAL OR ATHLETIC GROUPS, OR SCHOOL GROUPS?: NO
HOW HARD IS IT FOR YOU TO PAY FOR THE VERY BASICS LIKE FOOD, HOUSING, MEDICAL CARE, AND HEATING?: PATIENT DECLINED
HOW OFTEN DO YOU ATTEND CHURCH OR RELIGIOUS SERVICES?: NEVER
HOW OFTEN DO YOU GET TOGETHER WITH FRIENDS OR RELATIVES?: NEVER
IN A TYPICAL WEEK, HOW MANY TIMES DO YOU TALK ON THE PHONE WITH FAMILY, FRIENDS, OR NEIGHBORS?: NEVER
HOW OFTEN DO YOU ATTEND CHURCH OR RELIGIOUS SERVICES?: NEVER

## 2022-03-15 ASSESSMENT — LIFESTYLE VARIABLES
HOW OFTEN DO YOU HAVE A DRINK CONTAINING ALCOHOL: NEVER
HOW MANY STANDARD DRINKS CONTAINING ALCOHOL DO YOU HAVE ON A TYPICAL DAY: PATIENT DECLINED
HOW OFTEN DO YOU HAVE SIX OR MORE DRINKS ON ONE OCCASION: NEVER

## 2022-03-16 ENCOUNTER — OFFICE VISIT (OUTPATIENT)
Dept: MEDICAL GROUP | Facility: MEDICAL CENTER | Age: 52
End: 2022-03-16
Attending: PHYSICIAN ASSISTANT
Payer: MEDICAID

## 2022-03-16 ENCOUNTER — TELEPHONE (OUTPATIENT)
Dept: CARDIOLOGY | Facility: MEDICAL CENTER | Age: 52
End: 2022-03-16
Payer: MEDICAID

## 2022-03-16 VITALS
BODY MASS INDEX: 44.86 KG/M2 | TEMPERATURE: 98.9 F | DIASTOLIC BLOOD PRESSURE: 86 MMHG | HEIGHT: 60 IN | SYSTOLIC BLOOD PRESSURE: 134 MMHG | WEIGHT: 228.5 LBS | RESPIRATION RATE: 17 BRPM | OXYGEN SATURATION: 95 % | HEART RATE: 75 BPM

## 2022-03-16 DIAGNOSIS — Z00.00 HEALTH CARE MAINTENANCE: ICD-10-CM

## 2022-03-16 DIAGNOSIS — E11.65 UNCONTROLLED TYPE 2 DIABETES MELLITUS WITH HYPERGLYCEMIA (HCC): ICD-10-CM

## 2022-03-16 DIAGNOSIS — Z87.798 HISTORY OF BIRTH DEFECT: ICD-10-CM

## 2022-03-16 PROCEDURE — 99203 OFFICE O/P NEW LOW 30 MIN: CPT | Performed by: PHYSICIAN ASSISTANT

## 2022-03-16 PROCEDURE — 99213 OFFICE O/P EST LOW 20 MIN: CPT | Performed by: PHYSICIAN ASSISTANT

## 2022-03-16 PROCEDURE — RXMED WILLOW AMBULATORY MEDICATION CHARGE: Performed by: PHYSICIAN ASSISTANT

## 2022-03-16 RX ORDER — METFORMIN HYDROCHLORIDE 500 MG/1
1000 TABLET, EXTENDED RELEASE ORAL 2 TIMES DAILY
Qty: 120 TABLET | Refills: 5 | Status: SHIPPED | OUTPATIENT
Start: 2022-03-16

## 2022-03-16 RX ORDER — LANCETS 30 GAUGE
EACH MISCELLANEOUS
Qty: 100 EACH | Refills: 11 | Status: SHIPPED | OUTPATIENT
Start: 2022-03-16

## 2022-03-16 RX ORDER — GLUCOSAMINE HCL/CHONDROITIN SU 500-400 MG
CAPSULE ORAL
Qty: 100 EACH | Refills: 11 | Status: SHIPPED | OUTPATIENT
Start: 2022-03-16

## 2022-03-16 RX ORDER — DIPHENHYDRAMINE HYDROCHLORIDE 25 MG/1
CAPSULE, LIQUID FILLED ORAL
Qty: 1 KIT | Refills: 0 | Status: SHIPPED | OUTPATIENT
Start: 2022-03-16

## 2022-03-16 RX ORDER — CALCIUM CITRATE/VITAMIN D3 200MG-6.25
TABLET ORAL
Qty: 100 STRIP | Refills: 11 | Status: SHIPPED | OUTPATIENT
Start: 2022-03-16

## 2022-03-16 ASSESSMENT — FIBROSIS 4 INDEX: FIB4 SCORE: 1.36

## 2022-03-16 NOTE — TELEPHONE ENCOUNTER
Chart Prep      Tried calling pt about standing lab work and echo but phone says the number is not reachable.

## 2022-03-16 NOTE — PROGRESS NOTES
Chief Complaint   Patient presents with   • Establish Care     Subjective:     HPI:   Vilma Sun is a 51 y.o. female here to establish care and to discuss the evaluation and management of:    Uncontrolled diabetes mellitus (HCC)  This is a chronic condition.  Current medications: Metformin 1,000 mg BID however, experiencing abdominal pain and diarrhea. She is also on Jardiance 10 mg daily which she is tolerating well.   Last A1c: 10.4% taken 6 months ago.   Last Microalb/Cr ratio: UTD. Protein noted in urine.   Fasting sugars: None. Does not have an adequate glucometer and ran out of strips.   Last diabetic foot exam: UTD.  Last retinal eye exam: UTD.   ACEi/ARB: Losartan 50 mg.  Statin: Atorvastatin 40 mg nightly.  Aspirin: None.   Concomitant HTN: Yes, well controlled currently.   Performs nightly foot checks.     History of birth defect  Vilma presents today to establish care.  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  She currently has a handicap placard that is about to .  She is requesting a new application in order to renew this.    ROS  See HPI.     Allergies   Allergen Reactions   • Ethyl Abf-Eucgadlyx-Cbrtob-Pelon      Current medicines (including changes today)  Current Outpatient Medications   Medication Sig Dispense Refill   • Blood Glucose Monitoring Suppl (BLOOD GLUCOSE MONITOR SYSTEM) w/Device Kit Use to Test blood sugar as recommended by provider. 1 Kit 0   • glucose blood (TRUE METRIX BLOOD GLUCOSE TEST) strip Use one strip to test blood sugar once daily before breakfast. 100 Strip 11   • Lancets Use to test blood sugar every morning before breakfast (or first meal). 100 Each 11   • Alcohol Swabs Wipe site with prep pad prior to injection. 100 Each 11   • metFORMIN ER (GLUCOPHAGE XR) 500 MG TABLET SR 24 HR Take 2 Tablets by mouth 2 times a day. 120 Tablet 5   • furosemide (LASIX) 20 MG Tab Take 1 Tablet by mouth 2 times a day. 60 Tablet 11   • atorvastatin (LIPITOR) 40 MG Tab  Take 1 Tablet by mouth every day. 30 Tablet 11   • losartan (COZAAR) 25 MG Tab Take 1 Tablet by mouth every day. 30 Tablet 11   • spironolactone (ALDACTONE) 25 MG Tab Take 1 Tablet by mouth every day. 100 Tablet 2   • carvedilol (COREG) 6.25 MG Tab Take 1 Tablet by mouth 2 times a day with meals. 60 Tablet 11   • Empagliflozin 10 MG Tab Take 1 tablet by mouth every day. 30 Tablet 11   • glucose blood strip Use one strip to test blood sugar 3 times a day before meals and at bedtime. 50 Strip 0     No current facility-administered medications for this visit.     She  has a past medical history of CHF (congestive heart failure) ([facility]), Diabetes ([facility]), Hyperlipidemia, Hypertension, and Thyroid disease.  She  has a past surgical history that includes cholecystectomy.  Social History     Tobacco Use   • Smoking status: Never Smoker   • Smokeless tobacco: Never Used   Vaping Use   • Vaping Use: Never used   Substance Use Topics   • Alcohol use: Not Currently   • Drug use: Not Currently     Family History   Problem Relation Age of Onset   • Diabetes Mother    • Hyperlipidemia Mother    • Hypertension Mother    • Diabetes Maternal Grandfather    • Cancer Neg Hx    • Heart Disease Neg Hx    • Lung Disease Neg Hx      No family status information on file.     Patient Active Problem List    Diagnosis Date Noted   • History of birth defect 03/16/2022   • CHF (congestive heart failure) ([facility]) 08/31/2021   • Uncontrolled diabetes mellitus ([facility]) 08/31/2021   • Class 3 severe obesity in adult ([facility]) 08/31/2021   • At risk for obstructive sleep apnea 08/31/2021   • HLD (hyperlipidemia) 08/31/2021   • Homelessness 08/31/2021      Objective:     /86 (BP Location: Left arm, Patient Position: Sitting, BP Cuff Size: Adult long)   Pulse 75   Temp 37.2 °C (98.9 °F) (Skin)   Resp 17   Ht 1.524 m (5')   Wt 104 kg (228 lb 8 oz)   SpO2 95%  Body mass index is 44.63 kg/m².    Physical Exam:  Physical Exam  Vitals reviewed.    Constitutional:       Appearance: Normal appearance.   HENT:      Head: Normocephalic.      Comments: Fullness of the thyroid.     Right Ear: External ear normal.      Left Ear: External ear normal.      Mouth/Throat:      Mouth: Mucous membranes are moist.      Pharynx: Oropharynx is clear.      Comments: Mallampati score III.  Eyes:      Pupils: Pupils are equal, round, and reactive to light.   Cardiovascular:      Rate and Rhythm: Normal rate and regular rhythm.      Heart sounds: Normal heart sounds.   Pulmonary:      Effort: Pulmonary effort is normal.      Breath sounds: Normal breath sounds.   Musculoskeletal:      Right hand: Deformity present.      Left hand: Deformity present.      Cervical back: Normal range of motion.   Lymphadenopathy:      Cervical: No cervical adenopathy.   Skin:     General: Skin is warm and dry.   Neurological:      General: No focal deficit present.      Mental Status: She is alert and oriented to person, place, and time.       Assessment and Plan:     The following treatment plan was discussed:    1. Uncontrolled type 2 diabetes mellitus with hyperglycemia ([facility])  - This is a chronic uncontrolled condition.  - Last A1c was elevated at 10.4%.  - Plan: Start monitoring fasting blood glucose levels and keep a log of these readings.  New prescription for glucometer and supplies has been sent to pharmacy.  Discontinue instant release Metformin and start on extended release Metformin 1000 mg twice daily in an attempt to resolve current side effects of abdominal pain and diarrhea.  An intake appointment was scheduled with the [facility] pharmacist for diabetes education and management.  - HEMOGLOBIN A1C; Future  - Blood Glucose Monitoring Suppl (BLOOD GLUCOSE MONITOR SYSTEM) w/Device Kit; Use to Test blood sugar as recommended by provider.  Dispense: 1 Kit; Refill: 0  - glucose blood (TRUE METRIX BLOOD GLUCOSE TEST) strip; Use one strip to test blood sugar once daily before breakfast.   Dispense: 100 Strip; Refill: 11  - Lancets; Use to test blood sugar every morning before breakfast (or first meal).  Dispense: 100 Each; Refill: 11  - Alcohol Swabs; Wipe site with prep pad prior to injection.  Dispense: 100 Each; Refill: 11  - metFORMIN ER (GLUCOPHAGE XR) 500 MG TABLET SR 24 HR; Take 2 Tablets by mouth 2 times a day.  Dispense: 120 Tablet; Refill: 5    2. History of birth defect  - This is a chronic lifelong condition.  - Plan: A [employer] handicap placard application was filled out to completion and given to the patient.    3. Health care maintenance  - Plan: Vilma is overdue for yearly health maintenance labs.  Instructed to complete labs prior to annual visit in 2 weeks.  - CBC WITH DIFFERENTIAL; Future  - Comp Metabolic Panel; Future  - Lipid Profile; Future  - IRON/TOTAL IRON BIND; Future  - FERRITIN; Future  - VITAMIN D,25 HYDROXY; Future  - TSH WITH REFLEX TO FT4; Future  - VITAMIN B12; Future     Any change or worsening of signs or symptoms, patient encouraged to follow-up or report to emergency room for further evaluation. Patient verbalizes understanding and agrees.    Follow-Up: Return in about 2 weeks (around 3/30/2022) for Annual/wellness visit with pap smear.      PLEASE NOTE: This dictation was created using voice recognition software. I have made every reasonable attempt to correct obvious errors, but I expect that there are errors of grammar and possibly content that I did not discover before finalizing the note.

## 2022-03-16 NOTE — ASSESSMENT & PLAN NOTE
This is a chronic condition.  Current medications: Metformin 1,000 mg BID however, experiencing abdominal pain and diarrhea. She is also on Jardiance 10 mg daily which she is tolerating well.   Last A1c: 10.4% taken 6 months ago.   Last Microalb/Cr ratio: UTD. Protein noted in urine.   Fasting sugars: None. Does not have an adequate glucometer and ran out of strips.   Last diabetic foot exam: UTD.  Last retinal eye exam: UTD.   ACEi/ARB: Losartan 50 mg.  Statin: Atorvastatin 40 mg nightly.  Aspirin: None.   Concomitant HTN: Yes, well controlled currently.   Performs nightly foot checks.

## 2022-03-17 ENCOUNTER — PHARMACY VISIT (OUTPATIENT)
Dept: PHARMACY | Facility: MEDICAL CENTER | Age: 52
End: 2022-03-17
Payer: COMMERCIAL

## 2022-03-22 DIAGNOSIS — E11.65 UNCONTROLLED TYPE 2 DIABETES MELLITUS WITH HYPERGLYCEMIA (HCC): ICD-10-CM

## 2022-04-04 ENCOUNTER — PHARMACY VISIT (OUTPATIENT)
Dept: PHARMACY | Facility: MEDICAL CENTER | Age: 52
End: 2022-04-04
Payer: COMMERCIAL

## 2022-04-04 PROCEDURE — RXMED WILLOW AMBULATORY MEDICATION CHARGE: Performed by: NURSE PRACTITIONER

## 2022-05-02 PROCEDURE — RXMED WILLOW AMBULATORY MEDICATION CHARGE: Performed by: PHYSICIAN ASSISTANT

## 2022-05-03 PROCEDURE — RXMED WILLOW AMBULATORY MEDICATION CHARGE: Performed by: PHYSICIAN ASSISTANT

## 2022-05-03 PROCEDURE — RXMED WILLOW AMBULATORY MEDICATION CHARGE: Performed by: NURSE PRACTITIONER

## 2022-05-04 ENCOUNTER — PHARMACY VISIT (OUTPATIENT)
Dept: PHARMACY | Facility: MEDICAL CENTER | Age: 52
End: 2022-05-04
Payer: COMMERCIAL

## 2022-06-13 ENCOUNTER — PHARMACY VISIT (OUTPATIENT)
Dept: PHARMACY | Facility: MEDICAL CENTER | Age: 52
End: 2022-06-13
Payer: COMMERCIAL

## 2022-06-13 PROCEDURE — RXMED WILLOW AMBULATORY MEDICATION CHARGE: Performed by: PHYSICIAN ASSISTANT

## 2022-06-13 PROCEDURE — RXMED WILLOW AMBULATORY MEDICATION CHARGE: Performed by: NURSE PRACTITIONER

## 2022-06-14 ENCOUNTER — PHARMACY VISIT (OUTPATIENT)
Dept: PHARMACY | Facility: MEDICAL CENTER | Age: 52
End: 2022-06-14
Payer: COMMERCIAL

## 2022-10-05 ENCOUNTER — PHARMACY VISIT (OUTPATIENT)
Dept: PHARMACY | Facility: MEDICAL CENTER | Age: 52
End: 2022-10-05
Payer: COMMERCIAL

## 2022-10-05 PROCEDURE — RXMED WILLOW AMBULATORY MEDICATION CHARGE: Performed by: PHYSICIAN ASSISTANT

## 2022-10-05 PROCEDURE — RXMED WILLOW AMBULATORY MEDICATION CHARGE: Performed by: NURSE PRACTITIONER
